# Patient Record
Sex: FEMALE | Race: WHITE | NOT HISPANIC OR LATINO | Employment: PART TIME | URBAN - METROPOLITAN AREA
[De-identification: names, ages, dates, MRNs, and addresses within clinical notes are randomized per-mention and may not be internally consistent; named-entity substitution may affect disease eponyms.]

---

## 2017-09-15 ENCOUNTER — TRANSCRIBE ORDERS (OUTPATIENT)
Dept: ADMINISTRATIVE | Facility: HOSPITAL | Age: 47
End: 2017-09-15

## 2017-09-15 DIAGNOSIS — Z12.31 VISIT FOR SCREENING MAMMOGRAM: Primary | ICD-10-CM

## 2017-10-12 ENCOUNTER — APPOINTMENT (OUTPATIENT)
Dept: MAMMOGRAPHY | Facility: HOSPITAL | Age: 47
End: 2017-10-12
Attending: OBSTETRICS & GYNECOLOGY

## 2017-11-01 ENCOUNTER — HOSPITAL ENCOUNTER (OUTPATIENT)
Dept: MAMMOGRAPHY | Facility: HOSPITAL | Age: 47
Discharge: HOME | End: 2017-11-01
Attending: OBSTETRICS & GYNECOLOGY | Admitting: OBSTETRICS & GYNECOLOGY

## 2017-11-01 DIAGNOSIS — Z12.31 VISIT FOR SCREENING MAMMOGRAM: ICD-10-CM

## 2017-11-01 PROCEDURE — G0202 SCR MAMMO BI INCL CAD: HCPCS

## 2017-11-01 PROCEDURE — 77063 BREAST TOMOSYNTHESIS BI: CPT

## 2017-11-02 PROCEDURE — 77067 SCR MAMMO BI INCL CAD: CPT | Performed by: RADIOLOGY

## 2017-11-02 PROCEDURE — 77063 BREAST TOMOSYNTHESIS BI: CPT | Performed by: RADIOLOGY

## 2018-09-06 ENCOUNTER — LAB (OUTPATIENT)
Dept: LAB | Facility: HOSPITAL | Age: 48
End: 2018-09-06

## 2018-09-06 ENCOUNTER — TRANSCRIBE ORDERS (OUTPATIENT)
Dept: LAB | Facility: HOSPITAL | Age: 48
End: 2018-09-06

## 2018-09-06 DIAGNOSIS — L03.90 CELLULITIS OF SKIN WITH LYMPHANGITIS: Primary | ICD-10-CM

## 2018-09-06 DIAGNOSIS — L03.90 CELLULITIS OF SKIN WITH LYMPHANGITIS: ICD-10-CM

## 2018-09-06 PROCEDURE — 87255 GENET VIRUS ISOLATE HSV: CPT

## 2018-09-06 PROCEDURE — 87254 VIRUS INOCULATION SHELL VIA: CPT

## 2018-09-13 LAB
HSV SPEC CULT: NORMAL
VZV SPEC QL CULT: NORMAL

## 2019-06-04 ENCOUNTER — OFFICE VISIT (OUTPATIENT)
Dept: INTERNAL MEDICINE | Facility: CLINIC | Age: 49
End: 2019-06-04

## 2019-06-04 VITALS
HEART RATE: 80 BPM | WEIGHT: 133 LBS | HEIGHT: 64 IN | SYSTOLIC BLOOD PRESSURE: 100 MMHG | BODY MASS INDEX: 22.71 KG/M2 | DIASTOLIC BLOOD PRESSURE: 58 MMHG

## 2019-06-04 DIAGNOSIS — R73.09 ABNORMAL GLUCOSE: ICD-10-CM

## 2019-06-04 DIAGNOSIS — R14.0 BLOATING: ICD-10-CM

## 2019-06-04 DIAGNOSIS — E03.9 HYPOTHYROIDISM, UNSPECIFIED TYPE: ICD-10-CM

## 2019-06-04 DIAGNOSIS — N92.6 IRREGULAR MENSES: ICD-10-CM

## 2019-06-04 DIAGNOSIS — L85.3 DRY SKIN: ICD-10-CM

## 2019-06-04 DIAGNOSIS — E78.2 MIXED HYPERLIPIDEMIA: ICD-10-CM

## 2019-06-04 DIAGNOSIS — R53.83 FATIGUE, UNSPECIFIED TYPE: Primary | ICD-10-CM

## 2019-06-04 PROCEDURE — 99204 OFFICE O/P NEW MOD 45 MIN: CPT | Performed by: INTERNAL MEDICINE

## 2019-06-04 RX ORDER — CHLORAL HYDRATE 500 MG
CAPSULE ORAL
COMMUNITY

## 2019-06-04 RX ORDER — CHOLECALCIFEROL (VITAMIN D3) 25 MCG
1 TABLET,CHEWABLE ORAL DAILY
COMMUNITY

## 2019-06-04 RX ORDER — MELATONIN
1000 DAILY
COMMUNITY

## 2019-06-04 RX ORDER — BIOTIN 10000 MCG
1 CAPSULE ORAL
COMMUNITY

## 2019-06-04 RX ORDER — CETIRIZINE HYDROCHLORIDE 10 MG/1
10 TABLET ORAL DAILY
COMMUNITY

## 2019-06-04 NOTE — PROGRESS NOTES
Patient Care Team:  Angelica Lynch PA-C as PCP - General (Internal Medicine)    Chief Complaint::   Chief Complaint   Patient presents with   • Establish Care        Subjective     HPI  New patient here to establish care. , two boys 20 and 16 yo.  Full time  at [employer], working summer at [employer].   PMH:Dr. Burnette-IBS with constipation.  Pt has bowel movements every day.  May use stool softeners occ, if needed.  Mother has diabetes.  A1C last checked 3/13/2019 5.7%  Has hx of depression, mostly following childbirth.  Mother and Father treatment for depression.  Gall bladder removed-2015  Has hx of high cholesterol.  Has hx of low thyroid.  Has had bloodwork with Dipesh Hanson in March.  Pilates 2-3 times a week.  Walks 1-2 a week.  Periods are sporadic.  Not on any birth control.  Constant dry skin, dry eyes. Wounds have delayed healing.    Has been tested for food allergies.    Pt had episode of shingles on face/scalp and had treatment with two antivirals.        PMH  The following portions of the patient's history were reviewed and updated as appropriate: allergies, current medications, past family history, past medical history, past social history, past surgical history and problem list.    Review of Systems:   Review of Systems   Constitutional: Positive for fatigue. Negative for activity change, appetite change, chills, diaphoresis, fever, unexpected weight gain and unexpected weight loss.   HENT: Negative for congestion, ear pain, hearing loss and sinus pressure.    Eyes: Positive for redness. Negative for blurred vision, double vision and visual disturbance.   Respiratory: Positive for cough and wheezing. Negative for chest tightness and shortness of breath.    Cardiovascular: Negative for chest pain, palpitations and leg swelling.   Gastrointestinal: Positive for abdominal distention and constipation. Negative for abdominal pain, blood in stool, GERD and indigestion.  "  Endocrine: Negative for cold intolerance and heat intolerance.   Genitourinary: Positive for menstrual problem. Negative for difficulty urinating, dysuria and hematuria.   Musculoskeletal: Positive for myalgias. Negative for arthralgias and joint swelling.   Skin: Negative for color change and skin lesions.   Allergic/Immunologic: Negative for environmental allergies and food allergies.   Neurological: Negative for dizziness, tremors, seizures, syncope, speech difficulty, weakness, headache, memory problem and confusion.   Hematological: Does not bruise/bleed easily.   Psychiatric/Behavioral: Negative for decreased concentration, sleep disturbance and depressed mood. The patient is not nervous/anxious.        Vital Signs  Vitals:    06/04/19 1143   BP: 100/58   BP Location: Left arm   Patient Position: Sitting   Cuff Size: Adult   Pulse: 80   Weight: 60.3 kg (133 lb)   Height: 162.6 cm (64\")       Labs  No visits with results within 3 Month(s) from this visit.   Latest known visit with results is:   Lab on 09/06/2018   Component Date Value Ref Range Status   • Viral Culture, Rapid, Varicella 09/06/2018 Comment   Final    Negative  No Varicella zoster virus detected by rapid viral culture.   • HSV Culture/Type 09/06/2018 Comment   Final    Negative  No Herpes simplex virus isolated.       Imaging  All imaging past 24 hours:   Imaging Results (last 24 hours)     ** No results found for the last 24 hours. **            Current Outpatient Medications:   •  Biotin 10 MG capsule, Take 1 tablet by mouth., Disp: , Rfl:   •  cetirizine (zyrTEC) 10 MG tablet, Take 10 mg by mouth Daily., Disp: , Rfl:   •  cholecalciferol (VITAMIN D3) 1000 units tablet, Take 1,000 Units by mouth Daily., Disp: , Rfl:   •  Cyanocobalamin (B-12) 1000 MCG capsule, Take 1 tablet by mouth Daily., Disp: , Rfl:   •  Digestive Enzymes (DIGESTIVE ENZYME PO), Take 1 capsule by mouth Daily., Disp: , Rfl:   •  Multiple Vitamins-Minerals (MULTIVITAL PO), " Take 1 capsule by mouth Daily., Disp: , Rfl:   •  Omega-3 Fatty Acids (FISH OIL) 1000 MG capsule capsule, Take  by mouth Daily With Breakfast., Disp: , Rfl:   •  Probiotic Product (PROBIOTIC-10) capsule, Take 1 capsule by mouth Daily., Disp: , Rfl:     Physical Exam:    Physical Exam   Constitutional: She is oriented to person, place, and time. She appears well-developed and well-nourished.   HENT:   Head: Normocephalic and atraumatic.   Right Ear: Hearing, tympanic membrane, external ear and ear canal normal.   Left Ear: Hearing, tympanic membrane, external ear and ear canal normal.   Nose: Nose normal.   Mouth/Throat: Uvula is midline, oropharynx is clear and moist and mucous membranes are normal.   Eyes: Conjunctivae, EOM and lids are normal. Pupils are equal, round, and reactive to light.   Neck: Normal range of motion and full passive range of motion without pain. Neck supple.   Cardiovascular: Normal rate, regular rhythm, normal heart sounds and intact distal pulses.   Pulmonary/Chest: Effort normal and breath sounds normal.   Abdominal: Soft. Normal appearance and bowel sounds are normal. She exhibits distension.   Musculoskeletal: Normal range of motion. She exhibits no tenderness.   Neurological: She is alert and oriented to person, place, and time. She has normal strength and normal reflexes.   Skin: Skin is warm, dry and intact.   Psychiatric: She has a normal mood and affect. Her speech is normal and behavior is normal. Judgment and thought content normal. Cognition and memory are normal.   Vitals reviewed.      Procedures        Assessment/Plan   Problem List Items Addressed This Visit     None      Visit Diagnoses     Fatigue, unspecified type    -  Primary    Relevant Medications    cholecalciferol (VITAMIN D3) 1000 units tablet    Omega-3 Fatty Acids (FISH OIL) 1000 MG capsule capsule    Cyanocobalamin (B-12) 1000 MCG capsule    Multiple Vitamins-Minerals (MULTIVITAL PO)    Other Relevant Orders     Vitamin B12    Vitamin D 25 Hydroxy    Nuclear Antigen Antibody, IFA    C-reactive Protein    Rheumatoid Factor    Follicle Stimulating Hormone    Progesterone    Hypothyroidism, unspecified type        Relevant Orders    CBC & Differential    Mixed hyperlipidemia        Relevant Orders    Lipid Panel    Bloating        Relevant Medications    Digestive Enzymes (DIGESTIVE ENZYME PO)    Probiotic Product (PROBIOTIC-10) capsule    Other Relevant Orders    Comprehensive Metabolic Panel    Ambulatory Referral to Allergy (Completed)    Dry skin        Relevant Medications    Biotin 10 MG capsule    Other Relevant Orders    TSH    Thyroid Peroxidase Antibody    Sjogren's Antibody, Anti-SS-A / -SS-B    Lupus Anticoagulant    Ambulatory Referral to Allergy (Completed)    Abnormal glucose        Relevant Orders    Hemoglobin A1c    C-Peptide    Irregular menses        Relevant Orders    CBC & Differential    TSH    Follicle Stimulating Hormone    Progesterone      Reviewed labs and records from Dipesh Hanson.  Will need records from     Return in about 6 weeks (around 7/16/2019) for Annual physical.    Plan of care reviewed with patient at the conclusion of today's visit. Education was provided regarding diagnosis, management, and any prescribed or recommended OTC medications.Patient verbalizes understanding of and agreement with management plan.     Angelica Lynch PA-C

## 2019-06-05 ENCOUNTER — LAB (OUTPATIENT)
Dept: LAB | Facility: HOSPITAL | Age: 49
End: 2019-06-05

## 2019-06-05 DIAGNOSIS — R53.83 FATIGUE, UNSPECIFIED TYPE: ICD-10-CM

## 2019-06-05 DIAGNOSIS — E03.9 HYPOTHYROIDISM, UNSPECIFIED TYPE: ICD-10-CM

## 2019-06-05 DIAGNOSIS — R73.09 ABNORMAL GLUCOSE: ICD-10-CM

## 2019-06-05 DIAGNOSIS — R14.0 BLOATING: ICD-10-CM

## 2019-06-05 DIAGNOSIS — L85.3 DRY SKIN: ICD-10-CM

## 2019-06-05 DIAGNOSIS — E78.2 MIXED HYPERLIPIDEMIA: ICD-10-CM

## 2019-06-05 DIAGNOSIS — N92.6 IRREGULAR MENSES: ICD-10-CM

## 2019-06-05 LAB
25(OH)D3 SERPL-MCNC: 102.5 NG/ML (ref 30–100)
ALBUMIN SERPL-MCNC: 4.3 G/DL (ref 3.5–5.2)
ALBUMIN/GLOB SERPL: 1.7 G/DL
ALP SERPL-CCNC: 30 U/L (ref 39–117)
ALT SERPL W P-5'-P-CCNC: 15 U/L (ref 1–33)
ANION GAP SERPL CALCULATED.3IONS-SCNC: 12.6 MMOL/L
AST SERPL-CCNC: 14 U/L (ref 1–32)
BASOPHILS # BLD AUTO: 0.04 10*3/MM3 (ref 0–0.2)
BASOPHILS NFR BLD AUTO: 0.7 % (ref 0–1.5)
BILIRUB SERPL-MCNC: 0.5 MG/DL (ref 0.2–1.2)
BUN BLD-MCNC: 11 MG/DL (ref 6–20)
BUN/CREAT SERPL: 16.2 (ref 7–25)
CALCIUM SPEC-SCNC: 9.3 MG/DL (ref 8.6–10.5)
CHLORIDE SERPL-SCNC: 100 MMOL/L (ref 98–107)
CHOLEST SERPL-MCNC: 194 MG/DL (ref 0–200)
CHROMATIN AB SERPL-ACNC: <10 IU/ML (ref 0–14)
CO2 SERPL-SCNC: 23.4 MMOL/L (ref 22–29)
CREAT BLD-MCNC: 0.68 MG/DL (ref 0.57–1)
CRP SERPL-MCNC: <0.03 MG/DL (ref 0–0.5)
DEPRECATED RDW RBC AUTO: 47 FL (ref 37–54)
EOSINOPHIL # BLD AUTO: 0.05 10*3/MM3 (ref 0–0.4)
EOSINOPHIL NFR BLD AUTO: 0.9 % (ref 0.3–6.2)
ERYTHROCYTE [DISTWIDTH] IN BLOOD BY AUTOMATED COUNT: 12.9 % (ref 12.3–15.4)
FSH SERPL-ACNC: 1.41 MIU/ML
GFR SERPL CREATININE-BSD FRML MDRD: 92 ML/MIN/1.73
GLOBULIN UR ELPH-MCNC: 2.6 GM/DL
GLUCOSE BLD-MCNC: 85 MG/DL (ref 65–99)
HBA1C MFR BLD: 5.66 % (ref 4.8–5.6)
HCT VFR BLD AUTO: 44.3 % (ref 34–46.6)
HDLC SERPL-MCNC: 75 MG/DL (ref 40–60)
HGB BLD-MCNC: 13.9 G/DL (ref 12–15.9)
IMM GRANULOCYTES # BLD AUTO: 0.01 10*3/MM3 (ref 0–0.05)
IMM GRANULOCYTES NFR BLD AUTO: 0.2 % (ref 0–0.5)
LDLC SERPL CALC-MCNC: 103 MG/DL (ref 0–100)
LDLC/HDLC SERPL: 1.37 {RATIO}
LYMPHOCYTES # BLD AUTO: 1.58 10*3/MM3 (ref 0.7–3.1)
LYMPHOCYTES NFR BLD AUTO: 28.1 % (ref 19.6–45.3)
MCH RBC QN AUTO: 31.2 PG (ref 26.6–33)
MCHC RBC AUTO-ENTMCNC: 31.4 G/DL (ref 31.5–35.7)
MCV RBC AUTO: 99.6 FL (ref 79–97)
MONOCYTES # BLD AUTO: 0.51 10*3/MM3 (ref 0.1–0.9)
MONOCYTES NFR BLD AUTO: 9.1 % (ref 5–12)
NEUTROPHILS # BLD AUTO: 3.44 10*3/MM3 (ref 1.7–7)
NEUTROPHILS NFR BLD AUTO: 61 % (ref 42.7–76)
NRBC BLD AUTO-RTO: 0 /100 WBC (ref 0–0.2)
PLATELET # BLD AUTO: 289 10*3/MM3 (ref 140–450)
PMV BLD AUTO: 11.5 FL (ref 6–12)
POTASSIUM BLD-SCNC: 4.2 MMOL/L (ref 3.5–5.2)
PROGEST SERPL-MCNC: 12.1 NG/ML
PROT SERPL-MCNC: 6.9 G/DL (ref 6–8.5)
RBC # BLD AUTO: 4.45 10*6/MM3 (ref 3.77–5.28)
SODIUM BLD-SCNC: 136 MMOL/L (ref 136–145)
TRIGL SERPL-MCNC: 82 MG/DL (ref 0–150)
TSH SERPL DL<=0.05 MIU/L-ACNC: 2.88 MIU/ML (ref 0.27–4.2)
VIT B12 BLD-MCNC: 730 PG/ML (ref 211–946)
VLDLC SERPL-MCNC: 16.4 MG/DL (ref 5–40)
WBC NRBC COR # BLD: 5.63 10*3/MM3 (ref 3.4–10.8)

## 2019-06-05 PROCEDURE — 84443 ASSAY THYROID STIM HORMONE: CPT

## 2019-06-05 PROCEDURE — 83036 HEMOGLOBIN GLYCOSYLATED A1C: CPT

## 2019-06-05 PROCEDURE — 86431 RHEUMATOID FACTOR QUANT: CPT

## 2019-06-05 PROCEDURE — 86140 C-REACTIVE PROTEIN: CPT

## 2019-06-05 PROCEDURE — 82607 VITAMIN B-12: CPT

## 2019-06-05 PROCEDURE — 85670 THROMBIN TIME PLASMA: CPT

## 2019-06-05 PROCEDURE — 80061 LIPID PANEL: CPT

## 2019-06-05 PROCEDURE — 80053 COMPREHEN METABOLIC PANEL: CPT

## 2019-06-05 PROCEDURE — 85613 RUSSELL VIPER VENOM DILUTED: CPT

## 2019-06-05 PROCEDURE — 85732 THROMBOPLASTIN TIME PARTIAL: CPT

## 2019-06-05 PROCEDURE — 36415 COLL VENOUS BLD VENIPUNCTURE: CPT

## 2019-06-05 PROCEDURE — 86376 MICROSOMAL ANTIBODY EACH: CPT

## 2019-06-05 PROCEDURE — 82306 VITAMIN D 25 HYDROXY: CPT

## 2019-06-05 PROCEDURE — 84681 ASSAY OF C-PEPTIDE: CPT

## 2019-06-05 PROCEDURE — 86038 ANTINUCLEAR ANTIBODIES: CPT

## 2019-06-05 PROCEDURE — 86235 NUCLEAR ANTIGEN ANTIBODY: CPT

## 2019-06-05 PROCEDURE — 84144 ASSAY OF PROGESTERONE: CPT

## 2019-06-05 PROCEDURE — 85705 THROMBOPLASTIN INHIBITION: CPT

## 2019-06-05 PROCEDURE — 85025 COMPLETE CBC W/AUTO DIFF WBC: CPT

## 2019-06-05 PROCEDURE — 83001 ASSAY OF GONADOTROPIN (FSH): CPT

## 2019-06-06 LAB
ANA SER QL IA: NEGATIVE
C PEPTIDE SERPL-MCNC: 1.4 NG/ML (ref 1.1–4.4)
ENA SS-A AB SER-ACNC: <0.2 AI (ref 0–0.9)
ENA SS-B AB SER-ACNC: <0.2 AI (ref 0–0.9)
THYROPEROXIDASE AB SERPL-ACNC: 12 IU/ML (ref 0–34)

## 2019-06-07 LAB
LA NT DPL PPP: 32.6 SEC (ref 0–55)
LA NT DPL/LA NT HPL PPP-RTO: 1.03 RATIO (ref 0–1.4)
LA NT PLATELET PPP: 30 SEC (ref 0–51.9)
LUPUS ANTICOAGULANT REFLEX: NORMAL
SCREEN DRVVT: 27.8 SEC (ref 0–47)
THROMBIN TIME: 17.9 SEC (ref 0–23)

## 2019-10-10 ENCOUNTER — TELEPHONE (OUTPATIENT)
Dept: INTERNAL MEDICINE | Facility: CLINIC | Age: 49
End: 2019-10-10

## 2019-10-10 NOTE — TELEPHONE ENCOUNTER
"Patient was needing results from her allergist appt, claims that doctor didn't really do a full food allergy panel. That doctor said \"I can't really help you take some allergy pills\"   "

## 2019-10-17 NOTE — TELEPHONE ENCOUNTER
The referral you made 06/2019 was to Dr. Hernandez. There is one ov note scanned under media but that is from June.

## 2019-12-17 ENCOUNTER — TELEPHONE (OUTPATIENT)
Dept: INTERNAL MEDICINE | Facility: CLINIC | Age: 49
End: 2019-12-17

## 2019-12-17 DIAGNOSIS — J06.9 ACUTE URI: Primary | ICD-10-CM

## 2019-12-17 RX ORDER — AZITHROMYCIN 250 MG/1
TABLET, FILM COATED ORAL
Qty: 6 TABLET | Refills: 0 | Status: SHIPPED | OUTPATIENT
Start: 2019-12-17 | End: 2020-03-04

## 2019-12-17 NOTE — TELEPHONE ENCOUNTER
Patient called and stated she went to the [facility] over the weekend. They gave her antibiotics and her symptoms are still there and causing head congestion. Patient would like a refill of antibiotics. Please advise     Pt call back  [phone]

## 2019-12-17 NOTE — TELEPHONE ENCOUNTER
Called patient she has productive green cough at times. She has green nasal congestion. She was give a medrol dose pack and is on day 3. She is taking mucinex. At [facility] clinic flu was negative. She says the diagnosed her with a URI. Denies body aches and chills

## 2020-03-04 ENCOUNTER — OFFICE VISIT (OUTPATIENT)
Dept: ORTHOPEDIC SURGERY | Facility: CLINIC | Age: 50
End: 2020-03-04

## 2020-03-04 VITALS — HEART RATE: 93 BPM | WEIGHT: 129.8 LBS | BODY MASS INDEX: 22.16 KG/M2 | HEIGHT: 64 IN | OXYGEN SATURATION: 99 %

## 2020-03-04 DIAGNOSIS — M72.2 PLANTAR FASCIITIS OF LEFT FOOT: ICD-10-CM

## 2020-03-04 DIAGNOSIS — M25.80 SESAMOIDITIS: ICD-10-CM

## 2020-03-04 DIAGNOSIS — M79.672 LEFT FOOT PAIN: Primary | ICD-10-CM

## 2020-03-04 PROCEDURE — 99203 OFFICE O/P NEW LOW 30 MIN: CPT | Performed by: PHYSICIAN ASSISTANT

## 2020-03-04 NOTE — PROGRESS NOTES
[facility] [place] [facility] Note    Subjective     Patient: Ivette Warren  : 1970    Primary Care Provider: Angelica Lynch PA-C    Requesting Provider: As above    Pain of the Left Foot      History    Chief Complaint: Left foot pain    History of Present Illness: This is a very pleasant 49-year-old female presenting today to discuss her longstanding left forefoot pain as well as left heel pain.  She reports back in 1991 she had surgery at the first metatarsal head which she was told they removed a bursa.  Her symptoms prior to that were pain with walking and weightbearing at the first metatarsal head.  She was a dancer at the time.  She is continued to have intermittent pain under the first metatarsal head for many years.  Rates to be 7/10 and sharp she has had different types of orthotics which helps some.  She has tried to offload as well.  She remembers at some point being told that she was refracturing her sesamoids.  She make sure she wears good supportive shoes most of the time.  She avoids any heels because this worsens the pain.  She is also having heel pain that she rates to be 10/10.  Pain is worse first thing in the morning and getting up from a seated position.  She has had no treatment for this problem.  She is here for further evaluation and treatment recommendations.    Current Outpatient Medications on File Prior to Visit   Medication Sig Dispense Refill   • Biotin 10 MG capsule Take 1 tablet by mouth.     • cetirizine (zyrTEC) 10 MG tablet Take 10 mg by mouth Daily.     • cholecalciferol (VITAMIN D3) 1000 units tablet Take 1,000 Units by mouth Daily.     • Cyanocobalamin (B-12) 1000 MCG capsule Take 1 tablet by mouth Daily.     • Digestive Enzymes (DIGESTIVE ENZYME PO) Take 1 capsule by mouth Daily.     • Multiple Vitamins-Minerals (MULTIVITAL PO) Take 1 capsule by mouth Daily.     • Omega-3 Fatty Acids (FISH OIL) 1000 MG capsule capsule Take  by mouth Daily  With Breakfast.     • Probiotic Product (PROBIOTIC-10) capsule Take 1 capsule by mouth Daily.       No current facility-administered medications on file prior to visit.       Allergies   Allergen Reactions   • Codeine Itching and GI Intolerance      History reviewed. No pertinent past medical history.  Past Surgical History:   Procedure Laterality Date   • CHOLECYSTECTOMY  2015     Family History   Problem Relation Age of Onset   • Arthritis Mother    • Diabetes Mother    • Hyperlipidemia Mother    • Arthritis Father    • Heart disease Father    • Hypertension Father    • BRCA 1/2 Neg Hx    • Breast cancer Neg Hx    • Colon cancer Neg Hx    • Endometrial cancer Neg Hx    • Ovarian cancer Neg Hx       Social History     Socioeconomic History   • Marital status:      Spouse name: Not on file   • Number of children: Not on file   • Years of education: Not on file   • Highest education level: Not on file   Tobacco Use   • Smoking status: Never Smoker   • Smokeless tobacco: Never Used   Substance and Sexual Activity   • Alcohol use: Yes     Frequency: 4 or more times a week   • Drug use: No   • Sexual activity: Defer        Review of Systems   Constitutional: Negative.         Night sweats   HENT: Positive for congestion and mouth sores.    Eyes: Positive for itching.   Respiratory: Positive for cough.    Cardiovascular: Positive for palpitations.   Gastrointestinal: Positive for abdominal distention and constipation.   Endocrine: Negative.    Genitourinary: Negative.    Musculoskeletal: Positive for arthralgias.   Skin: Negative.    Allergic/Immunologic: Positive for food allergies.   Neurological: Negative.    Hematological: Negative.    Psychiatric/Behavioral: Positive for decreased concentration.       The following portions of the patient's history were reviewed and updated as appropriate: allergies, current medications, past family history, past medical history, past social history, past surgical history and  "problem list.      Objective      Physical Exam  Pulse 93   Ht 162.6 cm (64.02\")   Wt 58.9 kg (129 lb 12.8 oz)   SpO2 99%   BMI 22.27 kg/m²     Body mass index is 22.27 kg/m².    GENERAL: Body habitus: normal weight for height    Lower extremity edema: Left: none; Right: none    Varicose veins:  Left: none; Right: none    Gait: antalgic     Mental Status:  awake and alert; oriented to person, place, and time    Voice:  clear  SKIN:  Normal    Hair Growth:  Right:normal; Left:  normal  HEENT: Head: Normocephalic, atraumatic,  without obvious abnormality.  eye: normal external eye, no icterus   PULM:  Repiratory effort normal    Ortho Exam  V:  Dorsalis Pedis:  Right: 2+; Left:2+    Posterior Tibial: Right:2+; Left:2+    Capillary Refill:  Brisk  MSK:      Tibia:  Right:  non tender; Left:  non tender      Ankle:  Right: non tender; Left:  non tender      Foot:  Right:  non tender, ROM  normal and motor function  normal; Left:  tender under the 1st MT head with more tenderness over the tibial sesamoid.  Exquisitely tender over the origin of the plantar fascia., ROM  normal and motor function  normal      NEURO: Heel Walking:  Right:  normal; Left:  painful    Toe Walking:  Right:  normal; Left:  limited ability, painful     [place]-Anthony 5.07 monofilament test: not evaluated    Lower extremity sensation: intact     Calf Atrophy:none    Motor Function: all 5/5          Medical Decision Making    Data Review:   ordered and reviewed x-rays today    Assessment:  1. Left foot pain    2. Sesamoiditis    3. Plantar fasciitis of left foot        Plan:  1.  Left sesamoiditis.  I reviewed his x-rays and clinical findings with the patient.  On exam, she is tender under the first metatarsal head with more tenderness over the tibial sesamoid.  X-rays today show  no acute bony abnormalities, but the tibial sesamoid under the first metatarsal head does look as if it had fractured previously, but I did not appreciate any acute " "findings.  No other unusual bony features.  I question whether this is actually a bipartite sesamoid.  In any case, we discussed further treatment including offloading with a dancer's pad.  It may be flared more than usual secondary to her plantar fasciitis and avoiding weightbearing on her heel.  I have given her some adhesive dancer's pad for her shoes as well as given her prescription for custom orthotics with a dancer's pad.  She will return to see us in 2 to 3 months or sooner if needed.    2.  Left plantar fasciitis:  I explained plantar fasciitis in detail to the patient.  I explained to the bow-string mechanism of the plantar fascia.  I went over the current research of the [employer] with them.  I explained the treatments that were not statistically significant in improving the problem including: injection of steroids, special orthotics, special shoes, surgery, medication, ice, not working, etc.    I explained the treatments that are statistically significant at improving the problem.  These include stretching/night splinting, casting, PRP.    I explained the most important treatment: Stretching and night splinting.  I went over the patient information sheet with them, I showed them the stretches and they were able to reproduce them.  I emphasized the \"toe pull\" as the most important stretch.  They need to do the stretches 5 repetitions each, 6-8 times per day.  I explained how to do them at work, at home, before getting out of bed, before getting out of the car, and before getting up from a chair.    We provided them with a night splint.    If they do not improve after 4 months of aggressive stretching and night splinting, the next step will be a short leg fiberglass walking cast worn for 6 weeks.    · Preprinted education was provided to the patient.      Patient will begin aggressive stretching and night splinting return for casting if needed.    Patient history, " diagnosis and treatment plan discussed with Dr. Fernandez.        .        Mitali Bolden PA-C  03/05/20  10:38 AM

## 2020-05-06 ENCOUNTER — OFFICE VISIT (OUTPATIENT)
Dept: ORTHOPEDIC SURGERY | Facility: CLINIC | Age: 50
End: 2020-05-06

## 2020-05-06 VITALS — HEIGHT: 64 IN | BODY MASS INDEX: 22.88 KG/M2 | OXYGEN SATURATION: 99 % | WEIGHT: 134 LBS | HEART RATE: 105 BPM

## 2020-05-06 DIAGNOSIS — M25.80 SESAMOIDITIS: Primary | ICD-10-CM

## 2020-05-06 PROCEDURE — 99212 OFFICE O/P EST SF 10 MIN: CPT | Performed by: PHYSICIAN ASSISTANT

## 2020-05-06 NOTE — PROGRESS NOTES
[facility] [place] [facility] Note    Subjective     Patient: Ivette Warren  : 1970    Primary Care Provider: Angelica Lynch PA-C    Requesting Provider: As above    Follow-up (9 week f/u; Plantar fasciitis of left foot )      History    Chief Complaint: Follow-up left foot    History of Present Illness: Patient returns today for follow-up of her left foot sesamoiditis and plantar fasciitis.  She is gotten custom orthotics from the [facility] but reports they are painful feeling that the dancer's pad is too distal.  She does report some improvement of the pain with a good shoe wear.  She has no new symptoms.  The plantar fasciitis persists and she continues to stretch.    Current Outpatient Medications on File Prior to Visit   Medication Sig Dispense Refill   • Biotin 10 MG capsule Take 1 tablet by mouth.     • cetirizine (zyrTEC) 10 MG tablet Take 10 mg by mouth Daily.     • cholecalciferol (VITAMIN D3) 1000 units tablet Take 1,000 Units by mouth Daily.     • Cyanocobalamin (B-12) 1000 MCG capsule Take 1 tablet by mouth Daily.     • Digestive Enzymes (DIGESTIVE ENZYME PO) Take 1 capsule by mouth Daily.     • Multiple Vitamins-Minerals (MULTIVITAL PO) Take 1 capsule by mouth Daily.     • Omega-3 Fatty Acids (FISH OIL) 1000 MG capsule capsule Take  by mouth Daily With Breakfast.     • Probiotic Product (PROBIOTIC-10) capsule Take 1 capsule by mouth Daily.       No current facility-administered medications on file prior to visit.       Allergies   Allergen Reactions   • Codeine Itching and GI Intolerance      History reviewed. No pertinent past medical history.  Past Surgical History:   Procedure Laterality Date   • CHOLECYSTECTOMY       Family History   Problem Relation Age of Onset   • Arthritis Mother    • Diabetes Mother    • Hyperlipidemia Mother    • Arthritis Father    • Heart disease Father    • Hypertension Father    • BRCA 1/2 Neg Hx    • Breast cancer Neg Hx    •  "Colon cancer Neg Hx    • Endometrial cancer Neg Hx    • Ovarian cancer Neg Hx       Social History     Socioeconomic History   • Marital status:      Spouse name: Not on file   • Number of children: Not on file   • Years of education: Not on file   • Highest education level: Not on file   Tobacco Use   • Smoking status: Never Smoker   • Smokeless tobacco: Never Used   Substance and Sexual Activity   • Alcohol use: Yes     Frequency: 4 or more times a week   • Drug use: No   • Sexual activity: Defer        Review of Systems   Constitutional: Negative.    HENT: Negative.    Eyes: Negative.    Respiratory: Negative.    Cardiovascular: Negative.    Gastrointestinal: Negative.    Endocrine: Negative.    Genitourinary: Negative.    Musculoskeletal: Positive for arthralgias.   Skin: Negative.    Allergic/Immunologic: Negative.    Neurological: Negative.    Hematological: Negative.    Psychiatric/Behavioral: Negative.        The following portions of the patient's history were reviewed and updated as appropriate: allergies, current medications, past family history, past medical history, past social history, past surgical history and problem list.      Objective      Physical Exam  Pulse 105   Ht 162.6 cm (64.02\")   Wt 60.8 kg (134 lb)   SpO2 99%   BMI 22.99 kg/m²     Body mass index is 22.99 kg/m².    Patient is well developed, well nourished and in no acute distress.  Alert and oriented x 3.    Ortho Exam  V:  Dorsalis Pedis:   Left:2+    Posterior Tibial:Left:2+    Capillary Refill:  Brisk  MSK:  Tibia:   Left:  non tender      Ankle:   Left:  non tender      Foot:   Left:  tender over the tibial sesamoid.  Mild tenderness at the origin of the plantar fascia.      NEURO: [place]-Anthony 5.07 monofilament test: not evaluated    Lower extremity sensation: intact     Calf Atrophy:none    Motor Function: all 5/5            Medical Decision Making    Data Review:   none    Assessment:  1. Sesamoiditis  "       Plan:  Left sesamoiditis.  I reviewed past and current treatment and clinical findings with the patient.  Dr. Flynn is also seen the patient.  Her dancer's pad in her custom orthotics is too distal.  We have marked on the orthotic with a pen where it should be placed.  [facility].  There are 2 other things that we may consider adding to her treatment 1, cortisone injection to help with inflammation.  2, physical therapy.  Patient would like to try therapy before considering injection.  Plan today is that she do physical therapy at [facility] with Nickolas Abdul as he knows sesamoid-itis physical therapy.  She will return in 2 months to see how she is progressed or sooner if needed.    Patient history, diagnosis and treatment plan discussed with Dr. Flynn.        Mitali Bolden PA-C  05/06/20  13:45

## 2020-05-22 ENCOUNTER — TELEMEDICINE (OUTPATIENT)
Dept: INTERNAL MEDICINE | Facility: CLINIC | Age: 50
End: 2020-05-22

## 2020-05-22 DIAGNOSIS — R05.9 COUGH: Primary | ICD-10-CM

## 2020-05-22 PROCEDURE — U0003 INFECTIOUS AGENT DETECTION BY NUCLEIC ACID (DNA OR RNA); SEVERE ACUTE RESPIRATORY SYNDROME CORONAVIRUS 2 (SARS-COV-2) (CORONAVIRUS DISEASE [COVID-19]), AMPLIFIED PROBE TECHNIQUE, MAKING USE OF HIGH THROUGHPUT TECHNOLOGIES AS DESCRIBED BY CMS-2020-01-R: HCPCS | Performed by: NURSE PRACTITIONER

## 2020-05-22 PROCEDURE — 99213 OFFICE O/P EST LOW 20 MIN: CPT | Performed by: NURSE PRACTITIONER

## 2020-05-22 NOTE — PROGRESS NOTES
Ivette Warren  1970  [id number]  Patient Care Team:  Angelica Lynch PA-C as PCP - General (Internal Medicine)    Ivette Warren is a pleasant 49 y.o. female who presents for evaluation of Fever    Chief Complaint   Patient presents with   • Fever     This video visit occurred during the Coronavirus (Covid-19) public health emergency.  Time spent was approximately minutes.  Patient identity has been confirmed using name and date of birth.  I discussed with the patient the nature of our telemedicine visits that:  --I would evaluate the patient and recommend diagnostics and treatment based on my assessment.  --Our sessions are not being recorded in the personal health information is protected.  --Our team would provide follow-up care in person if/when needed.    HPI:   Did not feel well starting Mon night.  HX allergies.  Taking zyrtec and prn sudafed.  Reports mild symptoms including low grade fever, cough and nasal congestion that has been gradually worse this week.  Some dyspnea with exertion but this is not uncommon for her.  She denies serious symptoms of shortness of breath or serious illness.  She has been to grocery and [employer] last weekend, medical appointment Monday.  Has been following CDC guidelines when out to the grocery with masking and social distancing.  No past medical history on file.  Past Surgical History:   Procedure Laterality Date   • CHOLECYSTECTOMY  2015     Family History   Problem Relation Age of Onset   • Arthritis Mother    • Diabetes Mother    • Hyperlipidemia Mother    • Arthritis Father    • Heart disease Father    • Hypertension Father    • BRCA 1/2 Neg Hx    • Breast cancer Neg Hx    • Colon cancer Neg Hx    • Endometrial cancer Neg Hx    • Ovarian cancer Neg Hx      Social History     Tobacco Use   Smoking Status Never Smoker   Smokeless Tobacco Never Used     Allergies   Allergen Reactions   • Codeine Itching and GI Intolerance       Current Outpatient Medications:   •   Biotin 10 MG capsule, Take 1 tablet by mouth., Disp: , Rfl:   •  cetirizine (zyrTEC) 10 MG tablet, Take 10 mg by mouth Daily., Disp: , Rfl:   •  cholecalciferol (VITAMIN D3) 1000 units tablet, Take 1,000 Units by mouth Daily., Disp: , Rfl:   •  Cyanocobalamin (B-12) 1000 MCG capsule, Take 1 tablet by mouth Daily., Disp: , Rfl:   •  Digestive Enzymes (DIGESTIVE ENZYME PO), Take 1 capsule by mouth Daily., Disp: , Rfl:   •  Multiple Vitamins-Minerals (MULTIVITAL PO), Take 1 capsule by mouth Daily., Disp: , Rfl:   •  Omega-3 Fatty Acids (FISH OIL) 1000 MG capsule capsule, Take  by mouth Daily With Breakfast., Disp: , Rfl:   •  Probiotic Product (PROBIOTIC-10) capsule, Take 1 capsule by mouth Daily., Disp: , Rfl:     Review of Systems  There were no vitals taken for this visit.    Physical Exam   Constitutional: She appears well-developed and well-nourished.   Pulmonary/Chest: No respiratory distress.   Skin: Skin is warm and dry.   Psychiatric: She has a normal mood and affect. Her speech is normal and behavior is normal. Judgment and thought content normal. Cognition and memory are normal.   Vitals reviewed.      Results Review:  None    Assessment/Plan:  Ivette was seen today for fever.    Diagnoses and all orders for this visit:    Cough  -     SARS-CoV-2, CASH ([facility]) - Swab, Oropharynx; Future       There are no Patient Instructions on file for this visit.  Plan of care reviewed with patient at the conclusion of today's visit. Education was provided regarding diagnosis, management and any prescribed or recommended OTC medications.  Patient verbalizes understanding of and agreement with management plan.    No follow-ups on file.    *Note that portions of this note were completed with a voice recognition program.  Efforts were made to edit the dictation but occasionally words are transcribed.    HERMANN Osman

## 2020-07-28 ENCOUNTER — TELEMEDICINE (OUTPATIENT)
Dept: INTERNAL MEDICINE | Facility: CLINIC | Age: 50
End: 2020-07-28

## 2020-07-28 VITALS — WEIGHT: 130 LBS | BODY MASS INDEX: 22.2 KG/M2 | HEIGHT: 64 IN

## 2020-07-28 DIAGNOSIS — R53.83 FATIGUE, UNSPECIFIED TYPE: Primary | ICD-10-CM

## 2020-07-28 DIAGNOSIS — E34.9 HORMONE IMBALANCE: ICD-10-CM

## 2020-07-28 PROCEDURE — 99214 OFFICE O/P EST MOD 30 MIN: CPT | Performed by: PHYSICIAN ASSISTANT

## 2020-07-28 NOTE — PROGRESS NOTES
Patient Care Team:  Angelica Lynch PA-C as PCP - General (Internal Medicine)    Chief Complaint::   Chief Complaint   Patient presents with   • Fatigue    You have chosen to receive care through a video visit. Do you consent to use a video visit for your medical care today? YES    Subjective     HPI  49 year old female presents for followup on fatigue, hormone imbalance, and weight. She [place] and had hormone workup.  Lab [facility] results show low testosterone.  She reports decrease in libido and energy, heavy & irregular menses, and difficulty losing weight.  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  She has also recently moved.        The following portions of the patient's history were reviewed and updated as appropriate: active problem list, medication list, allergies, family history, social history    Review of Systems:   Review of Systems   Constitutional: Positive for fatigue. Negative for activity change, appetite change, diaphoresis, unexpected weight gain and unexpected weight loss.   HENT: Negative for hearing loss.    Eyes: Negative for visual disturbance.   Respiratory: Negative for chest tightness and shortness of breath.    Cardiovascular: Negative for chest pain, palpitations and leg swelling.   Gastrointestinal: Negative for abdominal pain, blood in stool, GERD and indigestion.   Endocrine: Negative for cold intolerance and heat intolerance.   Genitourinary: Positive for menstrual problem. Negative for dysuria and hematuria.   Musculoskeletal: Negative for arthralgias and myalgias.   Skin: Negative for skin lesions.   Allergic/Immunologic: Negative for environmental allergies and food allergies.   Neurological: Negative for tremors, seizures, syncope, speech difficulty, weakness, headache, memory problem and confusion.   Hematological: Does not bruise/bleed easily.  "  Psychiatric/Behavioral: Positive for decreased concentration. Negative for sleep disturbance and depressed mood. The patient is not nervous/anxious.        Vital Signs  Vitals:    07/28/20 1303   Weight: 59 kg (130 lb)   Height: 162.6 cm (64\")     Body mass index is 22.31 kg/m².    Labs  Admission on 05/22/2020, Discharged on 05/22/2020   Component Date Value Ref Range Status   • SARS-CoV-2, CASH 05/22/2020 Not Detected  Not Detected Final    This test was developed and its performance characteristics determined  by [facility]. This test has not been FDA cleared or  approved. This test has been authorized by FDA under an Emergency Use  Authorization (EUA). This test is only authorized for the duration of  time the declaration that circumstances exist justifying the  authorization of the emergency use of in vitro diagnostic tests for  detection of SARS-CoV-2 virus and/or diagnosis of COVID-19 infection  under section 564(b)(1) of the Act, 21 U.S.C. 360bbb-3(b)(1), unless  the authorization is terminated or revoked sooner.  When diagnostic testing is negative, the possibility of a false  negative result should be considered in the context of a patient's  recent exposures and the presence of clinical signs and symptoms  consistent with COVID-19. An individual without symptoms of COVID-19  and who is not shedding SARS-CoV-2 virus would expect to have a  negative (not detected) result in this assay.       Imaging  No radiology results for the last 30 days.      Current Outpatient Medications:   •  Biotin 10 MG capsule, Take 1 tablet by mouth., Disp: , Rfl:   •  cetirizine (zyrTEC) 10 MG tablet, Take 10 mg by mouth Daily., Disp: , Rfl:   •  cholecalciferol (VITAMIN D3) 1000 units tablet, Take 1,000 Units by mouth Daily., Disp: , Rfl:   •  Cyanocobalamin (B-12) 1000 MCG capsule, Take 1 tablet by mouth Daily., Disp: , Rfl:   •  Digestive Enzymes (DIGESTIVE ENZYME PO), Take 1 capsule by mouth Daily., Disp: , Rfl: "   •  Multiple Vitamins-Minerals (MULTIVITAL PO), Take 1 capsule by mouth Daily., Disp: , Rfl:   •  Omega-3 Fatty Acids (FISH OIL) 1000 MG capsule capsule, Take  by mouth Daily With Breakfast., Disp: , Rfl:   •  Probiotic Product (PROBIOTIC-10) capsule, Take 1 capsule by mouth Daily., Disp: , Rfl:     Physical Exam:    Physical Exam   Constitutional: She is oriented to person, place, and time. She appears well-developed and well-nourished.   HENT:   Head: Normocephalic and atraumatic.   Eyes: Pupils are equal, round, and reactive to light. Conjunctivae and EOM are normal.   Neck: Normal range of motion. Neck supple.   Neurological: She is alert and oriented to person, place, and time.   Skin: No erythema. No pallor.   Psychiatric: She has a normal mood and affect. Her behavior is normal. Judgment and thought content normal.       Procedures        Assessment/Plan   Problem List Items Addressed This Visit        Endocrine    Hormone imbalance    Current Assessment & Plan     Overall, her hormones appear to be in appropriate range for a perimenopausal woman.  Her testosterone is low, but that is expected.  Discussed the possibility of compounding hormone replacement therapy to help with libido, fatigue, and irregular menstrual cycle.  We will call [facility] to see if this is an option.            Other    Fatigue - Primary    Current Assessment & Plan     Discussed vitamin B B6 and B12 supplementation.  Continue daily cardiovascular exercise.  Continue healthy diet.         Relevant Medications    cholecalciferol (VITAMIN D3) 1000 units tablet    Omega-3 Fatty Acids (FISH OIL) 1000 MG capsule capsule    Cyanocobalamin (B-12) 1000 MCG capsule    Multiple Vitamins-Minerals (MULTIVITAL PO)      This visit has been rescheduled as a video visit to comply with patient safety concerns in accordance with CDC recommendations. Total time of discussion was 25 minutes.    Return in about 3 months (around 10/28/2020) for  Recheck.    Plan of care reviewed with patient at the conclusion of today's visit. Education was provided regarding diagnosis, management, and any prescribed or recommended OTC medications.Patient verbalizes understanding of and agreement with management plan.       Angelica Lynch PA-C    Please note that portions of this note were completed with a voice recognition program. Efforts were made to edit the dictations, but occasionally words are mistranscribed.

## 2020-07-29 PROBLEM — R53.83 FATIGUE: Status: ACTIVE | Noted: 2020-07-29

## 2020-07-29 PROBLEM — E34.9 HORMONE IMBALANCE: Status: ACTIVE | Noted: 2020-07-29

## 2020-07-30 NOTE — PATIENT INSTRUCTIONS

## 2020-07-30 NOTE — ASSESSMENT & PLAN NOTE
Discussed vitamin B B6 and B12 supplementation.  Continue daily cardiovascular exercise.  Continue healthy diet.

## 2020-07-30 NOTE — ASSESSMENT & PLAN NOTE
Overall, her hormones appear to be in appropriate range for a perimenopausal woman.  Her testosterone is low, but that is expected.  Discussed the possibility of compounding hormone replacement therapy to help with libido, fatigue, and irregular menstrual cycle.  We will call [facility] to see if this is an option.

## 2020-08-06 ENCOUNTER — TELEPHONE (OUTPATIENT)
Dept: INTERNAL MEDICINE | Facility: CLINIC | Age: 50
End: 2020-08-06

## 2020-08-06 NOTE — TELEPHONE ENCOUNTER
Please fax most recent lab results from [facility] (printed)     with DX of:  Decreased energy  Decreased libido    To [facility].

## 2020-08-28 ENCOUNTER — TELEPHONE (OUTPATIENT)
Dept: INTERNAL MEDICINE | Facility: CLINIC | Age: 50
End: 2020-08-28

## 2020-08-28 NOTE — TELEPHONE ENCOUNTER
Discussed with patient.  She will test if symptoms develop.  She is in mandatory quarantine for now.  
PATIENT CALLED AND STATED THAT SHE HAS BEEN EXPOSED TO STUDENT THAT IS COVID POSITIVE.  SHE SAID OTHER THAN HER USUAL ALLERGY SYMPTOMS LIKE SCRATCHY THROAT.  THE ONLY THE SYMPTOM THAT IS CONCERNING IS THE UPSET STOMACH WITH SOME MILD DIARRHEA.    SHE WAS COVID NEGATIVE 2 WEEKS AGO, BUT WANTS TO KNOW IF SHE SHOULD BE TESTED AGAIN.      PLEASE CONTACT PATIENT TO ADVISE.    CALLBACK:  [phone]  
phone

## 2020-09-04 RX ORDER — PROGESTERONE 100 %
POWDER (GRAM) MISCELLANEOUS
Qty: 1 BOTTLE | Refills: 5 | Status: SHIPPED | OUTPATIENT
Start: 2020-09-04 | End: 2020-12-17

## 2020-09-04 RX ORDER — EMOLLIENT BASE
CREAM (GRAM) TOPICAL
Qty: 30 G | Refills: 5 | Status: SHIPPED | OUTPATIENT
Start: 2020-09-04 | End: 2020-09-30 | Stop reason: SDUPTHER

## 2020-09-30 RX ORDER — EMOLLIENT BASE
CREAM (GRAM) TOPICAL
Qty: 30 G | Refills: 5 | Status: SHIPPED | OUTPATIENT
Start: 2020-09-30 | End: 2021-06-10 | Stop reason: SDUPTHER

## 2020-09-30 NOTE — TELEPHONE ENCOUNTER
Pharmacy Name: [facility] - [place], [place] - [facility] [address] - [phone]  - [phone]      Pharmacy representative name: GISSELLE    Pharmacy representative phone number: [phone]    What medication are you calling in regards to: Cream Base cream TESTOSTERONE     What question does the pharmacy have: NEEDS REFILL, THIRD REQUEST     Who is the provider that prescribed the medication: TONEY CONNELL    Additional notes: PER PHARMACIST, TONEY CONNELL IS PA SHE NEEDS TO SEE  IF PHYSICIAN WILL ORDER REFILLS THEY WON'T HAVE TO CALL EACH MONTH.

## 2020-12-17 ENCOUNTER — TELEPHONE (OUTPATIENT)
Dept: INTERNAL MEDICINE | Facility: CLINIC | Age: 50
End: 2020-12-17

## 2020-12-17 DIAGNOSIS — E34.9 HORMONE IMBALANCE: Primary | ICD-10-CM

## 2020-12-17 RX ORDER — PROGESTERONE 100 %
POWDER (GRAM) MISCELLANEOUS
Qty: 1 BOTTLE | Refills: 5 | Status: SHIPPED | OUTPATIENT
Start: 2020-12-17 | End: 2020-12-18

## 2020-12-17 NOTE — TELEPHONE ENCOUNTER
[facility] requested refill on Progesterone  Slow release 50 mg capsule  Directions- take 1 capsule every evening at bedtime on days 1 through 11 of cycle

## 2020-12-17 NOTE — TELEPHONE ENCOUNTER
Brandy jose Donnellson called stating the patient wants to take 50mg of progesterone days 1 through 25 of cycle instead of the 1-11.

## 2020-12-17 NOTE — TELEPHONE ENCOUNTER
[facility]: [id number] - [place], located within [facility] [address] AT [facility] OF [facility] & [address] - [phone] [facility] [phone] FX<br>[facility] - [place] [address] - [phone]  - [phone] FX     Pharmacy representative name: GISSELLE    Pharmacy representative phone number: [phone]    What medication are you calling in regards to: PROGESTERONE    What question does the pharmacy have: PATIENT NEEDS REFILLS AND THERE WAS A CHANGE REQUESTED ON THE FAX THAT WAS SENT OVER TO THE OFFICE.    Who is the provider that prescribed the medication: TONEY CONNELL

## 2020-12-18 DIAGNOSIS — E34.9 HORMONE IMBALANCE: ICD-10-CM

## 2020-12-18 RX ORDER — PROGESTERONE 100 %
POWDER (GRAM) MISCELLANEOUS
Qty: 1 BOTTLE | Refills: 5 | Status: SHIPPED | OUTPATIENT
Start: 2020-12-18 | End: 2020-12-21 | Stop reason: SDUPTHER

## 2020-12-21 ENCOUNTER — TELEPHONE (OUTPATIENT)
Dept: INTERNAL MEDICINE | Facility: CLINIC | Age: 50
End: 2020-12-21

## 2020-12-21 DIAGNOSIS — E34.9 HORMONE IMBALANCE: ICD-10-CM

## 2020-12-21 RX ORDER — PROGESTERONE 100 %
POWDER (GRAM) MISCELLANEOUS
Qty: 1 BOTTLE | Refills: 5 | Status: SHIPPED | OUTPATIENT
Start: 2020-12-21 | End: 2021-06-16 | Stop reason: SDUPTHER

## 2020-12-21 NOTE — TELEPHONE ENCOUNTER
PHARMACY CALLED STATE THAT THEY HAVE RECEIVED RX  Progesterone powder, BUT THEY ARE NOT A [facility]OUND PH[facility]Y AND WOULD NOT BE ABLE TO FILL.    PLEASE ADVISE.  CALL BACK:[phone]

## 2021-04-16 DIAGNOSIS — K59.01 SLOW TRANSIT CONSTIPATION: Primary | ICD-10-CM

## 2021-05-03 DIAGNOSIS — E34.9 HORMONE IMBALANCE: ICD-10-CM

## 2021-05-03 RX ORDER — PROGESTERONE 100 %
POWDER (GRAM) MISCELLANEOUS
Refills: 0 | Status: CANCELLED | OUTPATIENT
Start: 2021-05-03

## 2021-05-03 NOTE — TELEPHONE ENCOUNTER
Patient asked if she could do a telemedicine visit unable to find a  to come in for office visit

## 2021-06-01 ENCOUNTER — LAB (OUTPATIENT)
Dept: LAB | Facility: HOSPITAL | Age: 51
End: 2021-06-01

## 2021-06-01 ENCOUNTER — OFFICE VISIT (OUTPATIENT)
Dept: INTERNAL MEDICINE | Facility: CLINIC | Age: 51
End: 2021-06-01

## 2021-06-01 VITALS
HEIGHT: 64 IN | WEIGHT: 129 LBS | BODY MASS INDEX: 22.02 KG/M2 | SYSTOLIC BLOOD PRESSURE: 122 MMHG | TEMPERATURE: 96.9 F | HEART RATE: 95 BPM | DIASTOLIC BLOOD PRESSURE: 68 MMHG

## 2021-06-01 DIAGNOSIS — E34.9 HORMONE IMBALANCE: ICD-10-CM

## 2021-06-01 DIAGNOSIS — E78.2 MIXED HYPERLIPIDEMIA: ICD-10-CM

## 2021-06-01 DIAGNOSIS — R73.09 ABNORMAL GLUCOSE: ICD-10-CM

## 2021-06-01 DIAGNOSIS — Z00.00 ROUTINE MEDICAL EXAM: Primary | ICD-10-CM

## 2021-06-01 DIAGNOSIS — H53.9 VISUAL CHANGES: ICD-10-CM

## 2021-06-01 DIAGNOSIS — Z13.21 ENCOUNTER FOR VITAMIN DEFICIENCY SCREENING: ICD-10-CM

## 2021-06-01 DIAGNOSIS — K59.01 SLOW TRANSIT CONSTIPATION: ICD-10-CM

## 2021-06-01 LAB
BASOPHILS # BLD AUTO: 0.04 10*3/MM3 (ref 0–0.2)
BASOPHILS NFR BLD AUTO: 0.7 % (ref 0–1.5)
DEPRECATED RDW RBC AUTO: 42.2 FL (ref 37–54)
EOSINOPHIL # BLD AUTO: 0.1 10*3/MM3 (ref 0–0.4)
EOSINOPHIL NFR BLD AUTO: 1.7 % (ref 0.3–6.2)
ERYTHROCYTE [DISTWIDTH] IN BLOOD BY AUTOMATED COUNT: 12.1 % (ref 12.3–15.4)
HCT VFR BLD AUTO: 46.4 % (ref 34–46.6)
HGB BLD-MCNC: 15.6 G/DL (ref 12–15.9)
IMM GRANULOCYTES # BLD AUTO: 0.01 10*3/MM3 (ref 0–0.05)
IMM GRANULOCYTES NFR BLD AUTO: 0.2 % (ref 0–0.5)
LYMPHOCYTES # BLD AUTO: 2.13 10*3/MM3 (ref 0.7–3.1)
LYMPHOCYTES NFR BLD AUTO: 35.7 % (ref 19.6–45.3)
MCH RBC QN AUTO: 32 PG (ref 26.6–33)
MCHC RBC AUTO-ENTMCNC: 33.6 G/DL (ref 31.5–35.7)
MCV RBC AUTO: 95.3 FL (ref 79–97)
MONOCYTES # BLD AUTO: 0.54 10*3/MM3 (ref 0.1–0.9)
MONOCYTES NFR BLD AUTO: 9.1 % (ref 5–12)
NEUTROPHILS NFR BLD AUTO: 3.14 10*3/MM3 (ref 1.7–7)
NEUTROPHILS NFR BLD AUTO: 52.6 % (ref 42.7–76)
NRBC BLD AUTO-RTO: 0 /100 WBC (ref 0–0.2)
PLATELET # BLD AUTO: 315 10*3/MM3 (ref 140–450)
PMV BLD AUTO: 10.8 FL (ref 6–12)
RBC # BLD AUTO: 4.87 10*6/MM3 (ref 3.77–5.28)
WBC # BLD AUTO: 5.96 10*3/MM3 (ref 3.4–10.8)

## 2021-06-01 PROCEDURE — 80053 COMPREHEN METABOLIC PANEL: CPT

## 2021-06-01 PROCEDURE — 84144 ASSAY OF PROGESTERONE: CPT

## 2021-06-01 PROCEDURE — 80061 LIPID PANEL: CPT

## 2021-06-01 PROCEDURE — 84439 ASSAY OF FREE THYROXINE: CPT

## 2021-06-01 PROCEDURE — 84402 ASSAY OF FREE TESTOSTERONE: CPT

## 2021-06-01 PROCEDURE — 99396 PREV VISIT EST AGE 40-64: CPT | Performed by: PHYSICIAN ASSISTANT

## 2021-06-01 PROCEDURE — 82607 VITAMIN B-12: CPT

## 2021-06-01 PROCEDURE — 82670 ASSAY OF TOTAL ESTRADIOL: CPT

## 2021-06-01 PROCEDURE — 85025 COMPLETE CBC W/AUTO DIFF WBC: CPT

## 2021-06-01 PROCEDURE — 82306 VITAMIN D 25 HYDROXY: CPT

## 2021-06-01 PROCEDURE — 84403 ASSAY OF TOTAL TESTOSTERONE: CPT

## 2021-06-01 PROCEDURE — 84443 ASSAY THYROID STIM HORMONE: CPT

## 2021-06-01 NOTE — PROGRESS NOTES
Patient Care Team:  Angelica Lynch PA-C as PCP - General (Internal Medicine)    Chief Complaint::   Chief Complaint   Patient presents with   • Annual Exam   • Irritable Bowel Syndrome        Subjective     HPI  Ivette is a 50 year old female with history of hormone imbalance, hyperlipidemia, IBS, who presents for annual exam.  She has remained busy throughout the coronavirus pandemic as a  for [employer]. She has history of IBS, now regulated with low dose linzess.  She finds that a 72 mcg capsule works well for her.  She denies stomach upset or bloat.  She has lost 5 pounds over the past year with healthy diet and exercise.  Hormone compounded through [facility]-had episode of irregular menses, but started progesterone daily.  She denies irregular menses, pelvic pain, or vaginal discharge.        The following portions of the patient's history were reviewed and updated as appropriate: active problem list, medication list, allergies, family history, social history    Review of Systems:   Review of Systems   Constitutional: Negative for activity change, appetite change, diaphoresis, fatigue, unexpected weight gain and unexpected weight loss.   HENT: Negative for hearing loss.    Eyes: Negative for visual disturbance.   Respiratory: Negative for chest tightness and shortness of breath.    Cardiovascular: Negative for chest pain, palpitations and leg swelling.   Gastrointestinal: Negative for abdominal pain, blood in stool, GERD and indigestion.   Endocrine: Negative for cold intolerance and heat intolerance.   Genitourinary: Negative for dysuria and hematuria.   Musculoskeletal: Negative for arthralgias and myalgias.   Skin: Negative for skin lesions.   Neurological: Negative for tremors, seizures, syncope, speech difficulty, weakness, headache, memory problem and confusion.   Hematological: Does not bruise/bleed easily.   Psychiatric/Behavioral: Positive for decreased concentration.  "Negative for agitation, behavioral problems, dysphoric mood, sleep disturbance, negative for hyperactivity and depressed mood. The patient is not nervous/anxious.        Vital Signs  Vitals:    06/01/21 1456   BP: 122/68   BP Location: Left arm   Patient Position: Sitting   Cuff Size: Adult   Pulse: 95   Temp: 96.9 °F (36.1 °C)   TempSrc: Temporal   Weight: 58.5 kg (129 lb)   Height: 162.6 cm (64.02\")   PainSc: 0-No pain     Body mass index is 22.13 kg/m².    Labs  Lab on 06/01/2021   Component Date Value Ref Range Status   • Glucose 06/01/2021 88  65 - 99 mg/dL Final   • BUN 06/01/2021 11  6 - 20 mg/dL Final   • Creatinine 06/01/2021 0.76  0.57 - 1.00 mg/dL Final   • Sodium 06/01/2021 135* 136 - 145 mmol/L Final   • Potassium 06/01/2021 3.5  3.5 - 5.2 mmol/L Final   • Chloride 06/01/2021 98  98 - 107 mmol/L Final   • CO2 06/01/2021 24.5  22.0 - 29.0 mmol/L Final   • Calcium 06/01/2021 9.5  8.6 - 10.5 mg/dL Final   • Total Protein 06/01/2021 7.5  6.0 - 8.5 g/dL Final   • Albumin 06/01/2021 4.90  3.50 - 5.20 g/dL Final   • ALT (SGPT) 06/01/2021 26  1 - 33 U/L Final   • AST (SGOT) 06/01/2021 23  1 - 32 U/L Final   • Alkaline Phosphatase 06/01/2021 37* 39 - 117 U/L Final   • Total Bilirubin 06/01/2021 0.4  0.0 - 1.2 mg/dL Final   • eGFR Non  Amer 06/01/2021 81  >60 mL/min/1.73 Final   • Globulin 06/01/2021 2.6  gm/dL Final   • A/G Ratio 06/01/2021 1.9  g/dL Final   • BUN/Creatinine Ratio 06/01/2021 14.5  7.0 - 25.0 Final   • Anion Gap 06/01/2021 12.5  5.0 - 15.0 mmol/L Final   • Total Cholesterol 06/01/2021 239* 0 - 200 mg/dL Final   • Triglycerides 06/01/2021 70  0 - 150 mg/dL Final   • HDL Cholesterol 06/01/2021 83* 40 - 60 mg/dL Final   • LDL Cholesterol  06/01/2021 144* 0 - 100 mg/dL Final   • VLDL Cholesterol 06/01/2021 12  5 - 40 mg/dL Final   • LDL/HDL Ratio 06/01/2021 1.71   Final   • TSH 06/01/2021 2.530  0.270 - 4.200 uIU/mL Final   • Free T4 06/01/2021 0.97  0.93 - 1.70 ng/dL Final   • Vitamin B-12 " 06/01/2021 873  211 - 946 pg/mL Final   • 25 Hydroxy, Vitamin D 06/01/2021 82.8  30.0 - 100.0 ng/ml Final   • Estradiol 06/01/2021 42.1  pg/mL Final   • Progesterone 06/01/2021 0.46  ng/mL Final   • WBC 06/01/2021 5.96  3.40 - 10.80 10*3/mm3 Final   • RBC 06/01/2021 4.87  3.77 - 5.28 10*6/mm3 Final   • Hemoglobin 06/01/2021 15.6  12.0 - 15.9 g/dL Final   • Hematocrit 06/01/2021 46.4  34.0 - 46.6 % Final   • MCV 06/01/2021 95.3  79.0 - 97.0 fL Final   • MCH 06/01/2021 32.0  26.6 - 33.0 pg Final   • MCHC 06/01/2021 33.6  31.5 - 35.7 g/dL Final   • RDW 06/01/2021 12.1* 12.3 - 15.4 % Final   • RDW-SD 06/01/2021 42.2  37.0 - 54.0 fl Final   • MPV 06/01/2021 10.8  6.0 - 12.0 fL Final   • Platelets 06/01/2021 315  140 - 450 10*3/mm3 Final   • Neutrophil % 06/01/2021 52.6  42.7 - 76.0 % Final   • Lymphocyte % 06/01/2021 35.7  19.6 - 45.3 % Final   • Monocyte % 06/01/2021 9.1  5.0 - 12.0 % Final   • Eosinophil % 06/01/2021 1.7  0.3 - 6.2 % Final   • Basophil % 06/01/2021 0.7  0.0 - 1.5 % Final   • Immature Grans % 06/01/2021 0.2  0.0 - 0.5 % Final   • Neutrophils, Absolute 06/01/2021 3.14  1.70 - 7.00 10*3/mm3 Final   • Lymphocytes, Absolute 06/01/2021 2.13  0.70 - 3.10 10*3/mm3 Final   • Monocytes, Absolute 06/01/2021 0.54  0.10 - 0.90 10*3/mm3 Final   • Eosinophils, Absolute 06/01/2021 0.10  0.00 - 0.40 10*3/mm3 Final   • Basophils, Absolute 06/01/2021 0.04  0.00 - 0.20 10*3/mm3 Final   • Immature Grans, Absolute 06/01/2021 0.01  0.00 - 0.05 10*3/mm3 Final   • nRBC 06/01/2021 0.0  0.0 - 0.2 /100 WBC Final       Imaging  No radiology results for the last 30 days.      Current Outpatient Medications:   •  Biotin 10 MG capsule, Take 1 tablet by mouth., Disp: , Rfl:   •  cetirizine (zyrTEC) 10 MG tablet, Take 10 mg by mouth Daily., Disp: , Rfl:   •  cholecalciferol (VITAMIN D3) 1000 units tablet, Take 1,000 Units by mouth Daily., Disp: , Rfl:   •  Cream Base cream, Compound testosterone 0.5mg topical cream QD, Disp: 30 g, Rfl:  5  •  Cyanocobalamin (B-12) 1000 MCG capsule, Take 1 tablet by mouth Daily., Disp: , Rfl:   •  Digestive Enzymes (DIGESTIVE ENZYME PO), Take 1 capsule by mouth Daily., Disp: , Rfl:   •  linaclotide (LINZESS) 72 MCG capsule capsule, Take 1 capsule by mouth Every Morning Before Breakfast., Disp: 90 capsule, Rfl: 3  •  Omega-3 Fatty Acids (FISH OIL) 1000 MG capsule capsule, Take  by mouth Daily With Breakfast., Disp: , Rfl:   •  Probiotic Product (PROBIOTIC-10) capsule, Take 1 capsule by mouth Daily., Disp: , Rfl:   •  Progesterone powder, Compounded progesterone slow release capsules: 50mg on days 1 through 25 of cycle, Disp: 1 bottle, Rfl: 5    Physical Exam:    Physical Exam  Vitals reviewed.   Constitutional:       Appearance: Normal appearance. She is well-developed.   HENT:      Head: Normocephalic and atraumatic.      Right Ear: Hearing, tympanic membrane, ear canal and external ear normal.      Left Ear: Hearing, tympanic membrane, ear canal and external ear normal.      Nose: Nose normal.      Mouth/Throat:      Pharynx: Uvula midline.   Eyes:      General: Lids are normal.      Conjunctiva/sclera: Conjunctivae normal.      Pupils: Pupils are equal, round, and reactive to light.   Cardiovascular:      Rate and Rhythm: Normal rate and regular rhythm.      Heart sounds: Normal heart sounds.   Pulmonary:      Effort: Pulmonary effort is normal.      Breath sounds: Normal breath sounds.   Abdominal:      General: Bowel sounds are normal.      Palpations: Abdomen is soft.   Musculoskeletal:         General: Normal range of motion.      Cervical back: Full passive range of motion without pain, normal range of motion and neck supple.   Skin:     General: Skin is warm and dry.   Neurological:      General: No focal deficit present.      Mental Status: She is alert and oriented to person, place, and time. Mental status is at baseline.      Deep Tendon Reflexes: Reflexes are normal and symmetric.   Psychiatric:          Speech: Speech normal.         Behavior: Behavior normal.         Thought Content: Thought content normal.         Judgment: Judgment normal.     The 10-year ASCVD risk score (Ladanrussell CHANDLER Jr., et al., 2013) is: 0.9%    Values used to calculate the score:      Age: 50 years      Sex: Female      Is Non- : No      Diabetic: No      Tobacco smoker: No      Systolic Blood Pressure: 122 mmHg      Is BP treated: No      HDL Cholesterol: 83 mg/dL      Total Cholesterol: 239 mg/dL        ECG 12 Lead    Date/Time: 6/4/2021 8:39 PM  Performed by: Angelica Lynch PA-C  Authorized by: Angelica Lynch PA-C   Comparison: not compared with previous ECG   Previous ECG: no previous ECG available  Rhythm: sinus bradycardia    Clinical impression: normal ECG                Assessment/Plan   Problem List Items Addressed This Visit        Cardiac and Vasculature    Mixed hyperlipidemia    Overview     Continue low fat, low cholesterol diet.   Total cholesterol: 239  TRG 70  HDL 83           Relevant Orders    Lipid Panel (Completed)    ECG 12 Lead       Endocrine and Metabolic    Hormone imbalance    Relevant Medications    Progesterone powder    Other Relevant Orders    CBC & Differential (Completed)    TSH (Completed)    T4, Free (Completed)    Estradiol (Completed)    Progesterone (Completed)    Testosterone (Free & Total), LC / MS    Abnormal glucose    Relevant Orders    Comprehensive Metabolic Panel (Completed)    CBC & Differential    Comprehensive Metabolic Panel    Encounter for vitamin deficiency screening    Relevant Orders    Vitamin B12 (Completed)    Vitamin D 25 Hydroxy (Completed)       Eye    Visual changes    Relevant Orders    Ambulatory Referral to Ophthalmology       Gastrointestinal Abdominal     Slow transit constipation    Overview     Symptoms significanlty improved with  linzess 72mcg daily.         Relevant Medications    linaclotide (LINZESS) 72 MCG capsule capsule        Health Encounters    Routine medical exam - Primary    Overview     EKG reviewed with patient.  Normal sinus rhythm.  Order for fasting labs placed.  Patient encouraged to continue healthy eating and regular cardiovascular exercise.  She is current on Covid vaccinations.             Patient Wellness Counseling:   Plan of care reviewed with patient at the conclusion of today's visit. Education was provided in regards to diagnosis, diet and exercise, cervical cancer screening, self breast exams, breast cancer screening, and the importance of yearly mammograms.   Nutrition,physical activity, healthy weight,ways to reduce stress, adequate sleep, injury prevention, dental health, mental health, and immunizations.    Management and any prescribed or recommended OTC medications.  Patient verbalizes understanding of and agreement with management plan.    Return in about 6 months (around 12/1/2021) for Recheck.    Plan of care reviewed with patient at the conclusion of today's visit. Education was provided regarding diagnosis, management, and any prescribed or recommended OTC medications.Patient verbalizes understanding of and agreement with management plan.       Angelica Lynch PA-C    Please note that portions of this note were completed with a voice recognition program. Efforts were made to edit the dictations, but occasionally words are mistranscribed.

## 2021-06-02 LAB
25(OH)D3 SERPL-MCNC: 82.8 NG/ML (ref 30–100)
ALBUMIN SERPL-MCNC: 4.9 G/DL (ref 3.5–5.2)
ALBUMIN/GLOB SERPL: 1.9 G/DL
ALP SERPL-CCNC: 37 U/L (ref 39–117)
ALT SERPL W P-5'-P-CCNC: 26 U/L (ref 1–33)
ANION GAP SERPL CALCULATED.3IONS-SCNC: 12.5 MMOL/L (ref 5–15)
AST SERPL-CCNC: 23 U/L (ref 1–32)
BILIRUB SERPL-MCNC: 0.4 MG/DL (ref 0–1.2)
BUN SERPL-MCNC: 11 MG/DL (ref 6–20)
BUN/CREAT SERPL: 14.5 (ref 7–25)
CALCIUM SPEC-SCNC: 9.5 MG/DL (ref 8.6–10.5)
CHLORIDE SERPL-SCNC: 98 MMOL/L (ref 98–107)
CHOLEST SERPL-MCNC: 239 MG/DL (ref 0–200)
CO2 SERPL-SCNC: 24.5 MMOL/L (ref 22–29)
CREAT SERPL-MCNC: 0.76 MG/DL (ref 0.57–1)
ESTRADIOL SERPL HS-MCNC: 42.1 PG/ML
GFR SERPL CREATININE-BSD FRML MDRD: 81 ML/MIN/1.73
GLOBULIN UR ELPH-MCNC: 2.6 GM/DL
GLUCOSE SERPL-MCNC: 88 MG/DL (ref 65–99)
HDLC SERPL-MCNC: 83 MG/DL (ref 40–60)
LDLC SERPL CALC-MCNC: 144 MG/DL (ref 0–100)
LDLC/HDLC SERPL: 1.71 {RATIO}
POTASSIUM SERPL-SCNC: 3.5 MMOL/L (ref 3.5–5.2)
PROGEST SERPL-MCNC: 0.46 NG/ML
PROT SERPL-MCNC: 7.5 G/DL (ref 6–8.5)
SODIUM SERPL-SCNC: 135 MMOL/L (ref 136–145)
T4 FREE SERPL-MCNC: 0.97 NG/DL (ref 0.93–1.7)
TRIGL SERPL-MCNC: 70 MG/DL (ref 0–150)
TSH SERPL DL<=0.05 MIU/L-ACNC: 2.53 UIU/ML (ref 0.27–4.2)
VIT B12 BLD-MCNC: 873 PG/ML (ref 211–946)
VLDLC SERPL-MCNC: 12 MG/DL (ref 5–40)

## 2021-06-04 PROCEDURE — 93000 ELECTROCARDIOGRAM COMPLETE: CPT | Performed by: PHYSICIAN ASSISTANT

## 2021-06-05 LAB
TESTOST FREE SERPL-MCNC: 1.3 PG/ML (ref 0–4.2)
TESTOST SERPL-MCNC: 29.2 NG/DL

## 2021-06-10 DIAGNOSIS — E34.9 HORMONE IMBALANCE: ICD-10-CM

## 2021-06-10 RX ORDER — EMOLLIENT BASE
CREAM (GRAM) TOPICAL
Qty: 30 G | Refills: 5 | Status: SHIPPED | OUTPATIENT
Start: 2021-06-10 | End: 2021-06-11 | Stop reason: SDUPTHER

## 2021-06-10 NOTE — TELEPHONE ENCOUNTER
This is for testosterone 0.5mg/0.5mL versabase cream: Apply two clicks t inner arm or thigh every day.

## 2021-06-11 ENCOUNTER — TELEPHONE (OUTPATIENT)
Dept: INTERNAL MEDICINE | Facility: CLINIC | Age: 51
End: 2021-06-11

## 2021-06-11 DIAGNOSIS — E34.9 HORMONE IMBALANCE: Primary | ICD-10-CM

## 2021-06-11 RX ORDER — EMOLLIENT BASE
CREAM (GRAM) TOPICAL
Qty: 30 G | Refills: 5 | Status: SHIPPED | OUTPATIENT
Start: 2021-06-11 | End: 2021-06-16 | Stop reason: SDUPTHER

## 2021-06-11 NOTE — TELEPHONE ENCOUNTER
Brandy from [facility] called back and stated the prescription was sent over incorrect.   The rx should be for testosterone 0.5mg/0.5mL versabase cream.    It was sent over as .5mg per 1mg and this is incorrect.

## 2021-06-11 NOTE — TELEPHONE ENCOUNTER
Caller: [id number] - [address] [facility] [address] - [phone]  - [phone] FX    Relationship to patient: Pharmacy    Best call back number: [phone]    Patient is needing: [facility] SAID THEY RECEIVED A SCRIPT FOR CREAM BASE CREAM.  THEY SAID THEY ARE UNABLE TO FILL IT DUE IT NEEDING TO BE COMPOUNDED.  HE SAID [facility] DO[facility] SHOULD BE ABLE TO DO IT BUT WOULD NEED A NEW SCRIPT AS HE IS UNABLE TO TRANSFER.

## 2021-06-11 NOTE — TELEPHONE ENCOUNTER
Caller: [facility] - [place] [address] - [phone]  - [phone] FX    Relationship: Pharmacy    Best call back number: [phone]    Medication needed:    testosterone 0.5mg/0.5mL versabase cream    When do you need the refill by: 06/11/2021    What additional details did the patient provide when requesting the medication:   GISSELLE FROM [facility] STATED THAT PATIENT CALLED THE PHARMACY REGARDING MEDICATION AND STATED THAT THE PRESCRIPTION SH[facility]     Does the patient have less than a 3 day supply:  [x] Yes  [] No    What is the patient's preferred pharmacy:     [facility] [address] [facility] - [place], [place] - [address] - [phone] [facility] [phone] FX  [phone]

## 2021-06-13 NOTE — TELEPHONE ENCOUNTER
Please call [facility] and have them send over a request for the prescription so that we can sign and fax back.

## 2021-06-14 NOTE — TELEPHONE ENCOUNTER
Called and spoke with Brandy at [facility]. She states that she will resend prescription via fax to our office so we can sign and fax back. She verbalized understanding and had no further questions.

## 2021-06-16 DIAGNOSIS — E34.9 HORMONE IMBALANCE: ICD-10-CM

## 2021-06-16 RX ORDER — EMOLLIENT BASE
CREAM (GRAM) TOPICAL
Qty: 30 G | Refills: 5 | Status: CANCELLED | OUTPATIENT
Start: 2021-06-16

## 2021-06-16 RX ORDER — EMOLLIENT BASE
CREAM (GRAM) TOPICAL
Qty: 15 G | Refills: 4 | OUTPATIENT
Start: 2021-06-16

## 2021-06-16 NOTE — TELEPHONE ENCOUNTER
----- Message from Ivette Warren sent at 6/16/2021  3:22 PM EDT -----  Regarding: Prescription Question  Contact: [phone]  Seb GallardoAzalea,    My prescriptions were sent to the wrong  pharmacy! I need both progesterone and testosterone to be refilled. The only change it the 50 mg progesterone. I will stay on all the time and I don’t take a stronger one or stop taking in the middle for 5 days. [facility] was going to send the corrected one on progesterone.   Also, can I get tested for my FSH or did I get that result and didn’t see it? Maybe we could talk  about my results anyway bc I’m concerned about the ECG results!   Talk with you soon,   Ivette Warren.

## 2021-06-17 RX ORDER — PROGESTERONE 100 %
POWDER (GRAM) MISCELLANEOUS
Qty: 30 G | Refills: 5 | Status: SHIPPED | OUTPATIENT
Start: 2021-06-17 | End: 2021-09-24 | Stop reason: SDUPTHER

## 2021-06-17 RX ORDER — EMOLLIENT BASE
CREAM (GRAM) TOPICAL
Qty: 30 G | Refills: 5 | Status: SHIPPED | OUTPATIENT
Start: 2021-06-17 | End: 2021-09-24 | Stop reason: SDUPTHER

## 2021-06-17 NOTE — TELEPHONE ENCOUNTER
Can you write this down on a prescription and give this to me to sign and fax to [facility]?  I have received multiple messages to renew this prescription and there have been multiple issues with it.

## 2021-06-17 NOTE — TELEPHONE ENCOUNTER
The first issue was it went to the wrong pharmacy  Second issue it had the wrong strength  I called the pharmacy and spoke with Brandy yesterday and corrected rx so it is now correct.

## 2021-07-15 DIAGNOSIS — R00.2 PALPITATIONS: Primary | ICD-10-CM

## 2021-07-21 ENCOUNTER — OFFICE VISIT (OUTPATIENT)
Dept: CARDIOLOGY | Facility: HOSPITAL | Age: 51
End: 2021-07-21

## 2021-07-21 ENCOUNTER — HOSPITAL ENCOUNTER (OUTPATIENT)
Dept: CARDIOLOGY | Facility: HOSPITAL | Age: 51
Discharge: HOME | End: 2021-07-21

## 2021-07-21 VITALS
HEIGHT: 64 IN | WEIGHT: 135 LBS | RESPIRATION RATE: 20 BRPM | SYSTOLIC BLOOD PRESSURE: 108 MMHG | OXYGEN SATURATION: 99 % | TEMPERATURE: 98.1 F | BODY MASS INDEX: 23.05 KG/M2 | HEART RATE: 80 BPM | DIASTOLIC BLOOD PRESSURE: 67 MMHG

## 2021-07-21 DIAGNOSIS — R00.2 PALPITATIONS: ICD-10-CM

## 2021-07-21 DIAGNOSIS — R00.2 PALPITATIONS: Primary | ICD-10-CM

## 2021-07-21 PROCEDURE — 99213 OFFICE O/P EST LOW 20 MIN: CPT | Performed by: NURSE PRACTITIONER

## 2021-07-21 PROCEDURE — 93246 EXT ECG>7D<15D RECORDING: CPT

## 2021-07-21 NOTE — PROGRESS NOTES
[facility] Heart Monitor Documentation    Ivette Warren  1970  [id number]  07/21/21    HERMANN Mendez    [] ZIO XT Patch  Model [id number] Prescribed for N/A Days    · Serial Number: (N + 9 Digits) N   · Apply-By Date on Box:   · USPS Tracking Number:   · USPS Tracking        [] [employer] BodyGuardian MINI PLUS Mobile Cardiac Telemetry  Model BGMINIPLUS Prescribed for N/A Days    · Serial Number: (BGM + 7 Digits) BGM  · Shipped-By Date on Box:   · UPS Tracking Number: [id number]  · UPS Tracking      [] [employer] Body[employer]ian MINI Holter Monitor  Model BGMINIEL Prescribed for 7 Days    · Serial Number: (7 Digits) [id number]  · Shipped-By Date on Box: 251326  · UPS Tracking Number: [id number]  · UPS Tracking        This monitor was applied to the patient's chest and checked for proper functioning.  Ms. Ivette Warren was instructed in the proper use of this monitor.  She was given the opportunity to ask questions and left the office with the device 's instruction manual.    Veronica Zuluaga MA, 08:36 EDT, 07/21/21                  [facility] 8[facility]

## 2021-07-21 NOTE — PROGRESS NOTES
"Chief Complaint  Palpitations and Establish Care    Subjective    History of Present Illness {CC  Problem List  Visit  Diagnosis   Encounters  Notes  Medications  Labs  Result Review Imaging  Media :23}       History of Present Illness   50-year-old female presents the office today for ongoing evaluation of her palpitations.  She reports she has been experiencing palpitations for many years.  She notes it first started when her youngest child was 3 months old.  She wore a Holter at that time which was benign.  She reports she wore a Holter in 2018 at [facility] due to palpitations that were occurring on a more regular basis.  PVCs occurred 0.1% of the time and no PACs.  She reports over the last few weeks palpitations have occurred more regularly.  She reports intermittent dizziness and lightheadedness.  She notes that she takes Sudafed on a regular basis but stopped that for about a year without any change in her palpitations.  She reports she is very active and exercises regularly.  Objective     Vital Signs:   Vitals:    07/21/21 0802 07/21/21 0803 07/21/21 0804   BP: 115/72 107/75 108/67   BP Location: Right arm Left arm Left arm   Patient Position: Sitting Standing Sitting   Cuff Size: Adult Adult Adult   Pulse: 78 94 80   Resp:   20   Temp:  98.1 °F (36.7 °C) 98.1 °F (36.7 °C)   TempSrc:  Temporal Temporal   SpO2: 99% 99% 99%   Weight:   61.2 kg (135 lb)   Height:   162.6 cm (64\")     Body mass index is 23.17 kg/m².  Physical Exam  Vitals and nursing note reviewed.   Constitutional:       Appearance: Normal appearance.   HENT:      Head: Normocephalic.   Eyes:      Pupils: Pupils are equal, round, and reactive to light.   Cardiovascular:      Rate and Rhythm: Normal rate and regular rhythm.      Pulses: Normal pulses.      Heart sounds: Normal heart sounds. No murmur heard.     Pulmonary:      Effort: Pulmonary effort is normal.      Breath sounds: Normal breath sounds.   Abdominal:      " General: Bowel sounds are normal.      Palpations: Abdomen is soft.   Musculoskeletal:         General: Normal range of motion.      Cervical back: Normal range of motion.      Right lower leg: No edema.      Left lower leg: No edema.   Skin:     General: Skin is warm and dry.      Capillary Refill: Capillary refill takes less than 2 seconds.   Neurological:      Mental Status: She is alert and oriented to person, place, and time.   Psychiatric:         Mood and Affect: Mood normal.         Thought Content: Thought content normal.              Result Review  Data Reviewed:{ Labs  Result Review  Imaging  Med Tab  Media :23}   ECG 12 Lead (06/04/2021 20:39)  24-hour Holter May 2018 was an average heart rate of 89 bpm, PVCs 0.1%, PAC 0%, no arrhythmias noted           Assessment and Plan {CC Problem List  Visit Diagnosis  ROS  Review (Popup)  Health Maintenance  Quality  BestPractice  Medications  SmartSets  SnapShot Encounters  Media :23}   1. Palpitations  Discussed avoidance of caffeine   - Holter Monitor - 72 Hour Up To 15 Days; Future          Follow Up {Instructions Charge Capture  Follow-up Communications :23}   Return if symptoms worsen or fail to improve.    Patient was given instructions and counseling regarding her condition or for health maintenance advice. Please see specific information pulled into the AVS if appropriate.  Patient was instructed to call the [facility] with any questions, concerns, or worsening symptoms.    *Please note that portions of this note were completed with a voice recognition program. Efforts were made to edit the dictations, but occasionally words are mistranscribed.

## 2021-08-03 ENCOUNTER — TELEPHONE (OUTPATIENT)
Dept: INTERNAL MEDICINE | Facility: CLINIC | Age: 51
End: 2021-08-03

## 2021-08-03 NOTE — TELEPHONE ENCOUNTER
Caller: Ivette Warren    Relationship: Self    Best call back number: [phone]    What is the best time to reach you:ANYTIME    Who are you requesting to speak with (clinical staff, provider,  specific staff member): CLINICAL STAFF OR PROVIDER    What was the call regarding:  PATIENT IS TRYING TO FIGURE OUT IF WHAT SHE HAS IS PANCREATITIS AD WHAT SHE NEEDS TO DO    Do you require a callback: YES

## 2021-08-03 NOTE — TELEPHONE ENCOUNTER
Patient states she is out of town and has checked in to an out patient ED and they are checking her out now, states they have taken blood for labs and started her on an IV with fluids. Patient concerned that this could be from her linzess medication.

## 2021-08-21 PROCEDURE — 93248 EXT ECG>7D<15D REV&INTERPJ: CPT | Performed by: INTERNAL MEDICINE

## 2021-09-23 ENCOUNTER — TELEPHONE (OUTPATIENT)
Dept: INTERNAL MEDICINE | Facility: CLINIC | Age: 51
End: 2021-09-23

## 2021-09-23 NOTE — TELEPHONE ENCOUNTER
Caller: [facility] - [place], [place] - [facility] [address] - [phone]  - [phone] FX    Relationship: Pharmacy    Medication requested (name and dosage): Progesterone powder  WOULD LIKE TO INCREASE DOSAGE TO 75 MG ONCE IN THE EVENING     Pharmacy where request should be sent: [facility]     Best call back number: [phone]    Does the patient have less than a 3 day supply:  [] Yes  [] No    PHARMACY SAID THEY SENT A FAX TO THE OFFICE A COUPLE DAYS AGO ABOUT THIS

## 2021-09-24 DIAGNOSIS — E34.9 HORMONE IMBALANCE: ICD-10-CM

## 2021-09-24 RX ORDER — PROGESTERONE 100 %
POWDER (GRAM) MISCELLANEOUS
Qty: 30 G | Refills: 5 | Status: SHIPPED | OUTPATIENT
Start: 2021-09-24 | End: 2021-11-04 | Stop reason: ALTCHOICE

## 2021-09-24 RX ORDER — EMOLLIENT BASE
CREAM (GRAM) TOPICAL
Qty: 30 G | Refills: 5 | Status: SHIPPED | OUTPATIENT
Start: 2021-09-24 | End: 2021-11-04 | Stop reason: ALTCHOICE

## 2021-09-24 NOTE — TELEPHONE ENCOUNTER
----- Message from Aneglica Lynhc PA-C sent at 9/23/2021  5:33 PM EDT -----      ----- Message -----  From: Brissa Castrejon RegSched Rep  Sent: 9/23/2021   8:23 AM EDT  To: Angelica Lynch PA-C

## 2021-11-04 ENCOUNTER — TELEPHONE (OUTPATIENT)
Dept: INTERNAL MEDICINE | Facility: CLINIC | Age: 51
End: 2021-11-04

## 2021-11-04 DIAGNOSIS — E34.9 HORMONE IMBALANCE: ICD-10-CM

## 2021-11-04 DIAGNOSIS — L85.3 DRY SKIN: ICD-10-CM

## 2021-11-04 RX ORDER — EMOLLIENT BASE
CREAM (GRAM) TOPICAL
Qty: 30 G | Refills: 5 | Status: CANCELLED | OUTPATIENT
Start: 2021-11-04

## 2021-11-29 DIAGNOSIS — R61 HYPERHIDROSIS: Primary | ICD-10-CM

## 2021-12-02 ENCOUNTER — TELEPHONE (OUTPATIENT)
Dept: INTERNAL MEDICINE | Facility: CLINIC | Age: 51
End: 2021-12-02

## 2022-01-05 ENCOUNTER — TELEPHONE (OUTPATIENT)
Dept: INTERNAL MEDICINE | Facility: CLINIC | Age: 52
End: 2022-01-05

## 2022-01-05 NOTE — TELEPHONE ENCOUNTER
Pharmacy Name: [facility] - [place], [place] - [facility] [address] - [phone]  - [phone] FX     Pharmacy representative name: GISSELLE    Pharmacy representative phone number: [phone]    What medication are you calling in regards to: ESTRIOL ESTETROL AND TESTOSTERONE CREAM     What question does the pharmacy have: PHARMACY STATED THAT THE CREAMS NEED TO      Who is the provider that prescribed the medication: SONIA CONNELL    Additional notes: PLEASE CALL PHARMACY FOR CLARIFICATION

## 2022-01-10 ENCOUNTER — TELEPHONE (OUTPATIENT)
Dept: INTERNAL MEDICINE | Facility: CLINIC | Age: 52
End: 2022-01-10

## 2022-01-10 NOTE — TELEPHONE ENCOUNTER
Received request from [facility] to separate compounding creams.  Need clarification on what each prescription should say including strength, duration, and quantity and load to encounter.      The paperwork received and phone call received definitely need clarification.  Thank you!

## 2022-01-24 RX ORDER — AZITHROMYCIN 250 MG/1
TABLET, FILM COATED ORAL
Qty: 6 TABLET | Refills: 0 | Status: SHIPPED | OUTPATIENT
Start: 2022-01-24 | End: 2022-01-24 | Stop reason: SDUPTHER

## 2022-02-07 ENCOUNTER — TELEPHONE (OUTPATIENT)
Dept: INTERNAL MEDICINE | Facility: CLINIC | Age: 52
End: 2022-02-07

## 2022-02-07 RX ORDER — EMOLLIENT BASE
CREAM (GRAM) TOPICAL
Qty: 15 G | Refills: 0 | Status: SHIPPED | OUTPATIENT
Start: 2022-02-07

## 2022-02-07 RX ORDER — EMOLLIENT BASE
CREAM (GRAM) TOPICAL
Qty: 15 G | Refills: 2 | Status: SHIPPED | OUTPATIENT
Start: 2022-02-07 | End: 2022-08-02 | Stop reason: SDUPTHER

## 2022-05-28 DIAGNOSIS — K59.01 SLOW TRANSIT CONSTIPATION: ICD-10-CM

## 2022-05-31 RX ORDER — LINACLOTIDE 72 UG/1
CAPSULE, GELATIN COATED ORAL
Qty: 90 CAPSULE | Refills: 3 | Status: SHIPPED | OUTPATIENT
Start: 2022-05-31 | End: 2022-11-30

## 2022-05-31 NOTE — TELEPHONE ENCOUNTER
Rx Refill Note  Requested Prescriptions     Pending Prescriptions Disp Refills   • Linzess 72 MCG capsule capsule [Pharmacy Med Name: LINZESS 72MCG CAPSULES] 90 capsule 3     Sig: TAKE 1 CAPSULE BY MOUTH EVERY MORNING BEFORE BREAKFAST      Last office visit with prescribing clinician: 6/1/2021      Next office visit with prescribing clinician: Visit date not found            Lyn Arenas RN  05/31/22, 11:04 EDT

## 2022-08-02 ENCOUNTER — TELEPHONE (OUTPATIENT)
Dept: INTERNAL MEDICINE | Facility: CLINIC | Age: 52
End: 2022-08-02

## 2022-08-02 RX ORDER — EMOLLIENT BASE
CREAM (GRAM) TOPICAL
Qty: 15 G | Refills: 2 | Status: SHIPPED | OUTPATIENT
Start: 2022-08-02 | End: 2022-08-02 | Stop reason: SDUPTHER

## 2022-08-02 RX ORDER — EMOLLIENT BASE
CREAM (GRAM) TOPICAL
Qty: 15 G | Refills: 2 | Status: CANCELLED | OUTPATIENT
Start: 2022-08-02

## 2022-08-02 RX ORDER — EMOLLIENT BASE
CREAM (GRAM) TOPICAL
Qty: 20 G | Refills: 2 | Status: SHIPPED | OUTPATIENT
Start: 2022-08-02

## 2022-08-02 NOTE — TELEPHONE ENCOUNTER
DELETE AFTER REVIEWING: Telephone encounter to be sent to the clinical pool.    Pharmacy Name:  [facility] [address]    Pharmacy representative name: BUSHRA    Pharmacy representative phone number: [phone]    What medication are you calling in regards to: CREAM BASE CREAM    What question does the pharmacy have: PHARMACY WANTS SOME CLARITY ON EXACTLY WHAT THIS PRESCRIPTION WAS FOR.     Who is the provider that prescribed the medication: TONEY CONNELL    Additional notes: PLEASE CALL TO DISCUSS.

## 2022-08-02 NOTE — TELEPHONE ENCOUNTER
Cancel that out [facility].  I mistakenly sent that to them.  I resent it to her compounding pharmacy

## 2022-09-09 ENCOUNTER — TELEPHONE (OUTPATIENT)
Dept: INTERNAL MEDICINE | Facility: CLINIC | Age: 52
End: 2022-09-09

## 2022-09-09 RX ORDER — EMOLLIENT BASE
CREAM (GRAM) TOPICAL
Qty: 30 G | Refills: 5
Start: 2022-09-09

## 2022-09-09 NOTE — TELEPHONE ENCOUNTER
Caller: GISSEL[facility]    Relationship:     Best call back number: [phone]    What medications are you currently taking:   Current Outpatient Medications on File Prior to Visit   Medication Sig Dispense Refill   • azithromycin (Zithromax Z-Allan) 250 MG tablet Take 2 tablets by mouth on day 1, then 1 tablet daily on days 2-5 6 tablet 0   • Biotin 10 MG capsule Take 1 tablet by mouth.     • cetirizine (zyrTEC) 10 MG tablet Take 10 mg by mouth Daily.     • cholecalciferol (VITAMIN D3) 1000 units tablet Take 1,000 Units by mouth Daily.     • Cream Base cream Testosterone 0.5mg: Apply 2 clicks once daily 15 g 0   • Cream Base cream Estradiol 0.5mg/ estriol 0.5mg: Apply 3 clicks once a day 20 g 2   • Cyanocobalamin (B-12) 1000 MCG capsule Take 1 tablet by mouth Daily.     • Digestive Enzymes (DIGESTIVE ENZYME PO) Take 1 capsule by mouth Daily.     • Glycopyrronium Tosylate 2.4 % pads Apply 1 pad topically Daily. 30 each 3   • Linzess 72 MCG capsule capsule TAKE 1 CAPSULE BY MOUTH EVERY MORNING BEFORE BREAKFAST 90 capsule 3   • Omega-3 Fatty Acids (FISH OIL) 1000 MG capsule capsule Take  by mouth Daily With Breakfast.     • Probiotic Product (PROBIOTIC-10) capsule Take 1 capsule by mouth Daily.     • Pseudoephedrine HCl 30 MG tablet  Take 30 mg by mouth 2 (Two) Times a Day As Needed.       No current facility-administered medications on file prior to visit.        Which medication are you concerned about: CALLED TO CHECK ON STATUS FOR RX  PROGESTERONE WHICH WAS REQUEST ON 8/2

## 2022-11-11 ENCOUNTER — TELEPHONE (OUTPATIENT)
Dept: INTERNAL MEDICINE | Facility: CLINIC | Age: 52
End: 2022-11-11

## 2022-11-15 ENCOUNTER — TELEPHONE (OUTPATIENT)
Dept: INTERNAL MEDICINE | Facility: CLINIC | Age: 52
End: 2022-11-15

## 2022-11-15 NOTE — TELEPHONE ENCOUNTER
Caller: [facility] - [place] [address] - [phone]  - [phone]    Relationship: Pharmacy    Best call back number: [phone]    Requested Prescriptions:   Requested Prescriptions      No prescriptions requested or ordered in this encounter    TESTOSTERONE CREAM  ESTRADIAL CREAM    Pharm[facility] where request should be sent[facility] [address] - [phone]  - [phone]     Additional details provided by patient: PHARMACY CALLING BACK ABOUT THESE CREAMS THAT ARE COMPOUNDED AT THEIR PHARMACY.     Does the patient have less than a 3 day supply:  [x] Yes  [] No    Cristiano Lewis Rep   11/15/22 10:39 EST

## 2022-11-29 ENCOUNTER — TELEMEDICINE (OUTPATIENT)
Dept: INTERNAL MEDICINE | Facility: CLINIC | Age: 52
End: 2022-11-29

## 2022-11-29 ENCOUNTER — TELEPHONE (OUTPATIENT)
Dept: INTERNAL MEDICINE | Facility: CLINIC | Age: 52
End: 2022-11-29

## 2022-11-29 DIAGNOSIS — J01.00 ACUTE NON-RECURRENT MAXILLARY SINUSITIS: Primary | ICD-10-CM

## 2022-11-29 DIAGNOSIS — R05.1 ACUTE COUGH: ICD-10-CM

## 2022-11-29 PROCEDURE — 99213 OFFICE O/P EST LOW 20 MIN: CPT | Performed by: PHYSICIAN ASSISTANT

## 2022-11-29 RX ORDER — AZITHROMYCIN 250 MG/1
TABLET, FILM COATED ORAL
Qty: 6 TABLET | Refills: 0 | Status: SHIPPED | OUTPATIENT
Start: 2022-11-29 | End: 2023-03-03 | Stop reason: SDUPTHER

## 2022-11-29 RX ORDER — BENZONATATE 100 MG/1
100 CAPSULE ORAL NIGHTLY PRN
Qty: 30 CAPSULE | Refills: 1 | Status: SHIPPED | OUTPATIENT
Start: 2022-11-29

## 2022-11-29 NOTE — PROGRESS NOTES
[facility] [place] [facility]    Ivette Warren  1970   [id number]      Patient Care Team:  Angelica Lynch PA-C as PCP - General (Internal Medicine)    Chief Complaint::   Chief Complaint   Patient presents with   • Sinusitis   • Sore Throat     You have chosen to receive care through a video visit. Do you consent to use a video visit with Angelica Lynch at [facility] for your medical care today? Yes   HPI  Ivette Warren is a 51-year-old female date of birth 1970 who presents today for telemedicine visit.  She is a teacher at [employer].  For the past week and a half, she has had increase in congestion.  She has been traveling helping her mother recover from knee surgery.  She has been around kids that have been diagnosed with flu and strep.  Initial symptoms included head congestion and sore throat.  She has taken Mucinex and doubled up on her decongestant without any improvement of symptoms.  She now continues to have sinus pressure with increased pressure in both ears.   Now, she has thick yellow mucus, headache, and sore throat.  She denies chest pain, shortness of breath, palpitations, or fever.      Patient Active Problem List   Diagnosis   • Fatigue   • Hormone imbalance   • Slow transit constipation   • Mixed hyperlipidemia   • Abnormal glucose   • Encounter for vitamin deficiency screening   • Visual changes   • Routine medical exam   • Acute non-recurrent maxillary sinusitis   • Acute cough        History reviewed. No pertinent past medical history.    Past Surgical History:   Procedure Laterality Date   • CHOLECYSTECTOMY  2015       Family History   Problem Relation Age of Onset   • Arthritis Mother    • Diabetes Mother    • Hyperlipidemia Mother    • Arthritis Father    • Hypertension Father    • Heart attack Father 60   • Hyperlipidemia Father    • No Known Problems Sister    • No Known Problems Brother    • Hyperlipidemia Maternal Grandmother    • Alzheimer's  disease Maternal Grandmother    • Heart attack Maternal Grandfather    • Lung disease Paternal Grandmother    • Aneurysm Paternal Grandmother    • Lung disease Paternal Grandfather    • Other Paternal Grandfather         AAA   • BRCA 1/2 Neg Hx    • Breast cancer Neg Hx    • Colon cancer Neg Hx    • Endometrial cancer Neg Hx    • Ovarian cancer Neg Hx        Social History     Socioeconomic History   • Marital status:    Tobacco Use   • Smoking status: Never   • Smokeless tobacco: Never   Substance and Sexual Activity   • Alcohol use: Yes     Comment: 1 DRINKS A FEW TIMES A WK   • Drug use: No   • Sexual activity: Defer       Allergies   Allergen Reactions   • Codeine Itching and GI Intolerance       Review of Systems   Constitutional: Positive for fatigue. Negative for activity change, appetite change, diaphoresis, unexpected weight gain and unexpected weight loss.   HENT: Positive for congestion and sinus pressure. Negative for hearing loss.    Eyes: Negative for visual disturbance.   Respiratory: Positive for cough. Negative for chest tightness and shortness of breath.    Cardiovascular: Negative for chest pain, palpitations and leg swelling.   Gastrointestinal: Negative for abdominal pain, blood in stool, GERD and indigestion.   Endocrine: Negative for cold intolerance and heat intolerance.   Genitourinary: Negative for dysuria and hematuria.   Musculoskeletal: Negative for arthralgias and myalgias.   Skin: Negative for skin lesions.   Allergic/Immunologic: Positive for environmental allergies.   Neurological: Positive for headache. Negative for tremors, seizures, syncope, speech difficulty, weakness, memory problem and confusion.   Hematological: Does not bruise/bleed easily.   Psychiatric/Behavioral: Negative for sleep disturbance and depressed mood. The patient is not nervous/anxious.         Vital Signs  There were no vitals filed for this visit.  There is no height or weight on file to calculate  BMI.  BMI is within normal parameters. No other follow-up for BMI required.         Current Outpatient Medications:   •  azithromycin (Zithromax Z-Allan) 250 MG tablet, Take 2 tablets by mouth on day 1, then 1 tablet daily on days 2-5, Disp: 6 tablet, Rfl: 0  •  benzonatate (Tessalon Perles) 100 MG capsule, Take 1 capsule by mouth At Night As Needed for Cough., Disp: 30 capsule, Rfl: 1  •  Biotin 10 MG capsule, Take 1 tablet by mouth., Disp: , Rfl:   •  cetirizine (zyrTEC) 10 MG tablet, Take 10 mg by mouth Daily., Disp: , Rfl:   •  cholecalciferol (VITAMIN D3) 1000 units tablet, Take 1,000 Units by mouth Daily., Disp: , Rfl:   •  Cream Base cream, Testosterone 0.5mg: Apply 2 clicks once daily, Disp: 15 g, Rfl: 0  •  Cream Base cream, Estradiol 0.5mg/ estriol 0.5mg: Apply 3 clicks once a day, Disp: 20 g, Rfl: 2  •  Cream Base cream, Progesterone 150mg capsule: Take 1 capsule at bedtime, Disp: 30 g, Rfl: 5  •  Cyanocobalamin (B-12) 1000 MCG capsule, Take 1 tablet by mouth Daily., Disp: , Rfl:   •  Digestive Enzymes (DIGESTIVE ENZYME PO), Take 1 capsule by mouth Daily., Disp: , Rfl:   •  Glycopyrronium Tosylate 2.4 % pads, Apply 1 pad topically Daily., Disp: 30 each, Rfl: 3  •  Omega-3 Fatty Acids (FISH OIL) 1000 MG capsule capsule, Take  by mouth Daily With Breakfast., Disp: , Rfl:   •  Probiotic Product (PROBIOTIC-10) capsule, Take 1 capsule by mouth Daily., Disp: , Rfl:   •  Pseudoephedrine HCl 30 MG tablet , Take 30 mg by mouth 2 (Two) Times a Day As Needed., Disp: , Rfl:     Physical Exam  Vitals reviewed.   Constitutional:       Appearance: Normal appearance.   Skin:     Coloration: Skin is not jaundiced or pale.   Neurological:      Mental Status: She is alert and oriented to person, place, and time. Mental status is at baseline.   Psychiatric:         Mood and Affect: Mood normal.         Behavior: Behavior normal.         Thought Content: Thought content normal.          ACE III MINI            Results Review:     No results found for this or any previous visit (from the past 672 hour(s)).  Procedures    Medication Review: Medications reviewed and noted    Social History     Socioeconomic History   • Marital status:    Tobacco Use   • Smoking status: Never   • Smokeless tobacco: Never   Substance and Sexual Activity   • Alcohol use: Yes     Comment: 1 DRINKS A FEW TIMES A WK   • Drug use: No   • Sexual activity: Defer        Assessment/Plan:    Problem List Items Addressed This Visit        ENT    Acute non-recurrent maxillary sinusitis - Primary    Overview     Continue decongestant and Mucinex as directed.  Increase water intake.  Zithromax sent to pharmacy.  Tessalon Perles for cough.         Relevant Medications    azithromycin (Zithromax Z-Allan) 250 MG tablet       Other    Acute cough    Relevant Medications    benzonatate (Tessalon Perles) 100 MG capsule    This visit has been rescheduled as a video visit to comply with patient safety concerns in accordance with CDC recommendations. Total time of discussion was 15 minutes.      Plan of care reviewed with patient at the conclusion of today's visit. Education was provided regarding diagnosis, management, and any prescribed or recommended OTC medications.Patient verbalizes understanding of and agreement with management plan.       I spent 15 minutes caring for Ivette on this date of service. This time includes time spent by me in the following activities:preparing for the visit, reviewing tests, obtaining and/or reviewing a separately obtained history, performing a medically appropriate examination and/or evaluation , counseling and educating the patient/family/caregiver, ordering medications, tests, or procedures, referring and communicating with other health care professionals  and documenting information in the medical record    Angelica Lynch PA-C      Note: Part of this note may be an electronic transcription/translation of spoken language to printed  text using the Dragon Dictation system.

## 2022-11-29 NOTE — TELEPHONE ENCOUNTER
Pt would like to know if she can schedule a video visit with you sometime today. Pt states that she has had some sinus issues over the past few days. 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 Pt states she doesn't have tonsils so she doesn't think it could be that. Please advise

## 2022-11-30 PROBLEM — R05.1 ACUTE COUGH: Status: ACTIVE | Noted: 2022-11-30

## 2022-11-30 PROBLEM — J01.00 ACUTE NON-RECURRENT MAXILLARY SINUSITIS: Status: ACTIVE | Noted: 2022-11-30

## 2022-12-09 DIAGNOSIS — R61 HYPERHIDROSIS: ICD-10-CM

## 2022-12-13 DIAGNOSIS — E34.9 HORMONE IMBALANCE: Primary | ICD-10-CM

## 2022-12-13 DIAGNOSIS — E78.2 MIXED HYPERLIPIDEMIA: ICD-10-CM

## 2022-12-13 DIAGNOSIS — R73.09 ABNORMAL GLUCOSE: ICD-10-CM

## 2022-12-13 DIAGNOSIS — Z13.21 ENCOUNTER FOR VITAMIN DEFICIENCY SCREENING: ICD-10-CM

## 2023-03-03 ENCOUNTER — TELEMEDICINE (OUTPATIENT)
Dept: INTERNAL MEDICINE | Facility: CLINIC | Age: 53
End: 2023-03-03
Payer: COMMERCIAL

## 2023-03-03 DIAGNOSIS — R05.1 ACUTE COUGH: Primary | ICD-10-CM

## 2023-03-03 DIAGNOSIS — J20.8 ACUTE BRONCHITIS DUE TO OTHER SPECIFIED ORGANISMS: ICD-10-CM

## 2023-03-03 DIAGNOSIS — J01.00 ACUTE NON-RECURRENT MAXILLARY SINUSITIS: ICD-10-CM

## 2023-03-03 PROCEDURE — 99214 OFFICE O/P EST MOD 30 MIN: CPT | Performed by: INTERNAL MEDICINE

## 2023-03-03 RX ORDER — AZITHROMYCIN 250 MG/1
TABLET, FILM COATED ORAL
Qty: 6 TABLET | Refills: 0 | Status: SHIPPED | OUTPATIENT
Start: 2023-03-03

## 2023-03-03 RX ORDER — ALBUTEROL SULFATE 90 UG/1
2 AEROSOL, METERED RESPIRATORY (INHALATION) EVERY 4 HOURS PRN
Qty: 18 G | Refills: 1 | Status: SHIPPED | OUTPATIENT
Start: 2023-03-03

## 2023-03-03 NOTE — ASSESSMENT & PLAN NOTE
- She will continue using Sudafed, Mucinex, and Nasonex nasal spray.  - She will also continue drinking lots of fluids.  - Do warm saltwater gargles as needed for sore throat.  - I sent a Z-Allan to her pharmacy and she will start it tomorrow if not starting to feel better.  - I also sent in a albuterol inhaler to use as needed for wheeze, shortness of breath, and cough.

## 2023-03-03 NOTE — PATIENT INSTRUCTIONS
Patient Instructions   Problem List Items Addressed This Visit          ENT    Acute non-recurrent maxillary sinusitis    Overview     Continue decongestant and Mucinex as directed.  Increase water intake.  Zithromax sent to pharmacy.  Tessalon Perles for cough.         Relevant Medications    azithromycin (Zithromax Z-Allan) 250 MG tablet       Pulmonary and Pneumonias    Acute bronchitis due to other specified organisms    Relevant Medications    cetirizine (zyrTEC) 10 MG tablet    Pseudoephedrine HCl 30 MG tablet     benzonatate (Tessalon Perles) 100 MG capsule    azithromycin (Zithromax Z-Allan) 250 MG tablet    albuterol sulfate  (90 Base) MCG/ACT inhaler       Other    Acute cough - Primary    Current Assessment & Plan     - She will continue using Sudafed, Mucinex, and Nasonex nasal spray.  - She will also continue drinking lots of fluids.  - Do warm saltwater gargles as needed for sore throat.  - I sent a Z-Allan to her pharmacy and she will start it tomorrow if not starting to feel better.  - I also sent in a albuterol inhaler to use as needed for wheeze, shortness of breath, and cough.         Relevant Medications    benzonatate (Tessalon Perles) 100 MG capsule

## 2023-03-03 NOTE — PROGRESS NOTES
[facility]     Ivette Warren  1970   [id number]      Patient Care Team:  Angelica Lynch PA-C as PCP - General (Internal Medicine)     You have chosen to receive care through a telehealth visit.  Do you consent to use a video/audio connection for your medical care today? Yes  Video visit accomplished by using the providers desktop computer and cell phone and the patient's cell phone at her home in contact.    CC:cough         HPI  Patient is a 52 y.o. female presents with cough, congestion, postnasal drainage. Onset of symptoms was abrupt starting 7 days ago.  Chronicity acute. Severity severe.  Symptoms are associated with loss of voice ,sore throat,  Ears congested.  Drainage clear.Myalgias, especially upper back.Also chest congestion and mild short of breath when trying to work out and wheezing.    Pertinent negatives no chills or colored sputum.   Symptoms are aggravated by lying down or trying to work out.  Symptoms improve with mucinex and sudafed and nasonex.  Context chronic allergies.  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    CHRONIC CONDITIONS      No past medical history on file.    Past Surgical History:   Procedure Laterality Date   • CHOLECYSTECTOMY  2015       Family History   Problem Relation Age of Onset   • Arthritis Mother    • Diabetes Mother    • Hyperlipidemia Mother    • Arthritis Father    • Hypertension Father    • Heart attack Father 60   • Hyperlipidemia Father    • No Known Problems Sister    • No Known Problems Brother    • Hyperlipidemia Maternal Grandmother    • Alzheimer's disease Maternal Grandmother    • Heart attack Maternal Grandfather    • Lung disease Paternal Grandmother    • Aneurysm Paternal Grandmother    • Lung disease Paternal Grandfather    • Other Paternal Grandfather         AAA   • BRCA 1/2 Neg Hx    • Breast cancer Neg Hx    • Colon cancer Neg Hx    • Endometrial cancer Neg Hx    • Ovarian cancer Neg Hx        Social  History     Socioeconomic History   • Marital status:    Tobacco Use   • Smoking status: Never   • Smokeless tobacco: Never   Substance and Sexual Activity   • Alcohol use: Yes     Comment: 1 DRINKS A FEW TIMES A WK   • Drug use: No   • Sexual activity: Defer       Allergies   Allergen Reactions   • Codeine Itching and GI Intolerance         Vital Signs  There were no vitals filed for this visit.  There is no height or weight on file to calculate BMI.  BMI is within normal parameters. No other follow-up for BMI required.        Current Outpatient Medications:   •  azithromycin (Zithromax Z-Allan) 250 MG tablet, Take 2 tablets by mouth on day 1, then 1 tablet daily on days 2-5, Disp: 6 tablet, Rfl: 0  •  albuterol sulfate  (90 Base) MCG/ACT inhaler, Inhale 2 puffs Every 4 (Four) Hours As Needed for Wheezing., Disp: 18 g, Rfl: 1  •  benzonatate (Tessalon Perles) 100 MG capsule, Take 1 capsule by mouth At Night As Needed for Cough., Disp: 30 capsule, Rfl: 1  •  Biotin 10 MG capsule, Take 1 tablet by mouth., Disp: , Rfl:   •  cetirizine (zyrTEC) 10 MG tablet, Take 10 mg by mouth Daily., Disp: , Rfl:   •  cholecalciferol (VITAMIN D3) 1000 units tablet, Take 1,000 Units by mouth Daily., Disp: , Rfl:   •  Cream Base cream, Testosterone 0.5mg: Apply 2 clicks once daily, Disp: 15 g, Rfl: 0  •  Cream Base cream, Estradiol 0.5mg/ estriol 0.5mg: Apply 3 clicks once a day, Disp: 20 g, Rfl: 2  •  Cream Base cream, Progesterone 150mg capsule: Take 1 capsule at bedtime, Disp: 30 g, Rfl: 5  •  Cyanocobalamin (B-12) 1000 MCG capsule, Take 1 tablet by mouth Daily., Disp: , Rfl:   •  Digestive Enzymes (DIGESTIVE ENZYME PO), Take 1 capsule by mouth Daily., Disp: , Rfl:   •  Glycopyrronium Tosylate 2.4 % pads, Apply 1 pad topically Daily., Disp: 30 each, Rfl: 3  •  Omega-3 Fatty Acids (FISH OIL) 1000 MG capsule capsule, Take  by mouth Daily With Breakfast., Disp: , Rfl:   •  Probiotic Product (PROBIOTIC-10) capsule, Take 1  capsule by mouth Daily., Disp: , Rfl:   •  Pseudoephedrine HCl 30 MG tablet , Take 30 mg by mouth 2 (Two) Times a Day As Needed., Disp: , Rfl:     Physical Exam:    Physical Exam  Constitutional:       Appearance: She is normal weight. She is ill-appearing. She is not toxic-appearing.   HENT:      Head: Normocephalic.   Eyes:      Extraocular Movements: Extraocular movements intact.      Conjunctiva/sclera: Conjunctivae normal.      Pupils: Pupils are equal, round, and reactive to light.   Pulmonary:      Effort: Pulmonary effort is normal.   Neurological:      General: No focal deficit present.      Mental Status: She is alert and oriented to person, place, and time.   Psychiatric:         Mood and Affect: Mood normal.         Thought Content: Thought content normal.          ACE III MINI        Results Review:    None    CMP:  Lab Results   Component Value Date    BUN 11 06/01/2021    CREATININE 0.76 06/01/2021    EGFRIFNONA 81 06/01/2021    BCR 14.5 06/01/2021     (L) 06/01/2021    K 3.5 06/01/2021    CO2 24.5 06/01/2021    CALCIUM 9.5 06/01/2021    ALBUMIN 4.90 06/01/2021    BILITOT 0.4 06/01/2021    ALKPHOS 37 (L) 06/01/2021    AST 23 06/01/2021    ALT 26 06/01/2021     HbA1c:  Lab Results   Component Value Date    HGBA1C 5.66 (H) 06/05/2019     Microalbumin:  No results found for: MICROALBUR, POCMALB, POCCREAT  Lipid Panel  Lab Results   Component Value Date    CHOL 239 (H) 06/01/2021    TRIG 70 06/01/2021    HDL 83 (H) 06/01/2021     (H) 06/01/2021    AST 23 06/01/2021    ALT 26 06/01/2021       Medication Review: Medications reviewed and noted  Patient Instructions   Problem List Items Addressed This Visit        ENT    Acute non-recurrent maxillary sinusitis    Overview     Continue decongestant and Mucinex as directed.  Increase water intake.  Zithromax sent to pharmacy.  Tessalon Perles for cough.         Relevant Medications    azithromycin (Zithromax Z-Allan) 250 MG tablet       Pulmonary and  Pneumonias    Acute bronchitis due to other specified organisms    Relevant Medications    cetirizine (zyrTEC) 10 MG tablet    Pseudoephedrine HCl 30 MG tablet     benzonatate (Tessalon Perles) 100 MG capsule    azithromycin (Zithromax Z-Allan) 250 MG tablet    albuterol sulfate  (90 Base) MCG/ACT inhaler       Other    Acute cough - Primary    Current Assessment & Plan     - She will continue using Sudafed, Mucinex, and Nasonex nasal spray.  - She will also continue drinking lots of fluids.  - Do warm saltwater gargles as needed for sore throat.  - I sent a Z-Allan to her pharmacy and she will start it tomorrow if not starting to feel better.  - I also sent in a albuterol inhaler to use as needed for wheeze, shortness of breath, and cough.         Relevant Medications    benzonatate (Tessalon Perles) 100 MG capsule          Diagnosis Plan   1. Acute cough        2. Acute bronchitis due to other specified organisms        3. Acute non-recurrent maxillary sinusitis  azithromycin (Zithromax Z-Allan) 250 MG tablet        Follow Up: She will come back to see Angelica for an annual physical in a month or 2.        Plan of care reviewed with patient at the conclusion of today's visit. Education was provided regarding diagnosis, management, and any prescribed or recommended OTC medications.Patient verbalizes understanding of and agreement with management plan.         Leticia Rasheed MD      Transcribed from ambient dictation for Leticia Rasheed MD by Daphne Fernandez.  03/03/23   10:20 EST    Patient or patient representative verbalized consent to the visit recording.  I have personally performed the services described in this document as transcribed by the above individual, and it is both accurate and complete.

## 2023-05-18 ENCOUNTER — OFFICE VISIT (OUTPATIENT)
Dept: INTERNAL MEDICINE | Facility: CLINIC | Age: 53
End: 2023-05-18
Payer: COMMERCIAL

## 2023-05-18 VITALS
TEMPERATURE: 98.6 F | DIASTOLIC BLOOD PRESSURE: 60 MMHG | SYSTOLIC BLOOD PRESSURE: 116 MMHG | BODY MASS INDEX: 22.36 KG/M2 | HEIGHT: 64 IN | WEIGHT: 131 LBS | HEART RATE: 88 BPM | OXYGEN SATURATION: 96 %

## 2023-05-18 DIAGNOSIS — Z00.00 ROUTINE MEDICAL EXAM: Primary | ICD-10-CM

## 2023-05-18 DIAGNOSIS — Z13.21 ENCOUNTER FOR VITAMIN DEFICIENCY SCREENING: ICD-10-CM

## 2023-05-18 DIAGNOSIS — E78.2 MIXED HYPERLIPIDEMIA: ICD-10-CM

## 2023-05-18 DIAGNOSIS — Z12.31 ENCOUNTER FOR SCREENING MAMMOGRAM FOR MALIGNANT NEOPLASM OF BREAST: ICD-10-CM

## 2023-05-18 DIAGNOSIS — K59.01 SLOW TRANSIT CONSTIPATION: ICD-10-CM

## 2023-05-18 DIAGNOSIS — R73.09 ABNORMAL GLUCOSE: ICD-10-CM

## 2023-05-18 DIAGNOSIS — E34.9 HORMONE IMBALANCE: ICD-10-CM

## 2023-05-18 NOTE — PROGRESS NOTES
[facility] Internal Medicine    Ivette Warren  1970   [id number]      Patient Care Team:  Angelica Lynch PA-C as PCP - General (Internal Medicine)    Chief Complaint::   Chief Complaint   Patient presents with   • Annual Exam   • Labs          HPI  Ivette Warren is a 52 year old female date of birth 1970 who presents for routine annual exam. Past medical history significant for hyperlipidemia and hormone imbalance.  She reports her most recent labs were performed 4/13/2023 by Dr. Ellis.   Her fasting blood glucose was 102. Her total cholesterol 243 and , HDL 72. .   A1C 5.6% borderline.   She feels that she has a form of metabolic syndrome.  Cortisol level was normal, but it has been high in the past. She does not have menstrual cycles anymore because she is on HRT, which is managed through [facility].   She is now using progesterone pills 150mg nightly.  She is on 1.5mg of estradiol of cream daily.  Testosterone cream 1.5mg daily.   She has started metformin 250mg nightly.     Patient Active Problem List   Diagnosis   • Fatigue   • Hormone imbalance   • Slow transit constipation   • Mixed hyperlipidemia   • Abnormal glucose   • Encounter for vitamin deficiency screening   • Visual changes   • Routine medical exam   • Acute non-recurrent maxillary sinusitis   • Acute cough   • Acute bronchitis due to other specified organisms   • Encounter for screening mammogram for malignant neoplasm of breast        No past medical history on file.    Past Surgical History:   Procedure Laterality Date   • CHOLECYSTECTOMY  2015       Family History   Problem Relation Age of Onset   • Arthritis Mother    • Diabetes Mother    • Hyperlipidemia Mother    • Arthritis Father    • Hypertension Father    • Heart attack Father 60   • Hyperlipidemia Father    • No Known Problems Sister    • No Known Problems Brother    • Hyperlipidemia Maternal Grandmother    • Alzheimer's disease Maternal  "Grandmother    • Heart attack Maternal Grandfather    • Lung disease Paternal Grandmother    • Aneurysm Paternal Grandmother    • Lung disease Paternal Grandfather    • Other Paternal Grandfather         AAA   • BRCA 1/2 Neg Hx    • Breast cancer Neg Hx    • Colon cancer Neg Hx    • Endometrial cancer Neg Hx    • Ovarian cancer Neg Hx        Social History     Socioeconomic History   • Marital status:    Tobacco Use   • Smoking status: Never   • Smokeless tobacco: Never   Substance and Sexual Activity   • Alcohol use: Yes     Comment: 1 DRINKS A FEW TIMES A WK   • Drug use: No   • Sexual activity: Defer       Allergies   Allergen Reactions   • Codeine Itching and GI Intolerance       Review of Systems   Constitutional: Negative for activity change, appetite change, diaphoresis, fatigue, unexpected weight gain and unexpected weight loss.   HENT: Negative for hearing loss.    Eyes: Negative for visual disturbance.   Respiratory: Negative for chest tightness and shortness of breath.    Cardiovascular: Negative for chest pain, palpitations and leg swelling.   Gastrointestinal: Positive for constipation. Negative for abdominal pain, blood in stool, GERD and indigestion.   Endocrine: Negative for cold intolerance and heat intolerance.   Genitourinary: Negative for dysuria and hematuria.   Musculoskeletal: Negative for arthralgias and myalgias.   Skin: Negative for skin lesions.   Neurological: Negative for tremors, seizures, syncope, speech difficulty, weakness, headache, memory problem and confusion.   Hematological: Does not bruise/bleed easily.   Psychiatric/Behavioral: Negative for sleep disturbance and depressed mood. The patient is not nervous/anxious.         Vital Signs  Vitals:    05/18/23 1401   BP: 116/60   BP Location: Left arm   Patient Position: Sitting   Cuff Size: Adult   Pulse: 88   Temp: 98.6 °F (37 °C)   TempSrc: Infrared   SpO2: 96%   Weight: 59.4 kg (131 lb)   Height: 162.6 cm (64.02\")     Body " mass index is 22.47 kg/m².  BMI is within normal parameters. No other follow-up for BMI required.     Advance Care Planning   ACP discussion was held with the patient during this visit. Patient does not have an advance directive, information provided.       Current Outpatient Medications:   •  albuterol sulfate  (90 Base) MCG/ACT inhaler, Inhale 2 puffs Every 4 (Four) Hours As Needed for Wheezing., Disp: 18 g, Rfl: 1  •  B Complex-Biotin-FA (B-COMPLEX PO), Take  by mouth Daily., Disp: , Rfl:   •  benzonatate (Tessalon Perles) 100 MG capsule, Take 1 capsule by mouth At Night As Needed for Cough., Disp: 30 capsule, Rfl: 1  •  Biotin 10 MG capsule, Take 1 tablet by mouth Daily., Disp: , Rfl:   •  cetirizine (zyrTEC) 10 MG tablet, Take 1 tablet by mouth Daily., Disp: , Rfl:   •  Cream Base cream, Testosterone 0.5mg: Apply 2 clicks once daily, Disp: 15 g, Rfl: 0  •  Cream Base cream, Estradiol 0.5mg/ estriol 0.5mg: Apply 3 clicks once a day, Disp: 20 g, Rfl: 2  •  Cream Base cream, Progesterone 150mg capsule: Take 1 capsule at bedtime, Disp: 30 g, Rfl: 5  •  Cyanocobalamin (B-12) 1000 MCG capsule, Take 1 tablet by mouth Daily., Disp: , Rfl:   •  Digestive Enzymes (DIGESTIVE ENZYME PO), Take 1 capsule by mouth Daily., Disp: , Rfl:   •  Omega-3 Fatty Acids (FISH OIL) 1000 MG capsule capsule, Take  by mouth Daily With Breakfast., Disp: , Rfl:   •  Probiotic Product (PROBIOTIC-10) capsule, Take 1 capsule by mouth Daily., Disp: , Rfl:   •  Pseudoephedrine HCl 30 MG tablet , Take 30 mg by mouth 2 (Two) Times a Day As Needed., Disp: , Rfl:   •  azithromycin (Zithromax Z-Allan) 250 MG tablet, Take 2 tablets by mouth on day 1, then 1 tablet daily on days 2-5 (Patient not taking: Reported on 5/18/2023), Disp: 6 tablet, Rfl: 0  •  Glycopyrronium Tosylate 2.4 % pads, Apply 1 pad topically Daily., Disp: 30 each, Rfl: 3    Physical Exam  Vitals reviewed.   Constitutional:       Appearance: Normal appearance. She is well-developed  and normal weight.   HENT:      Head: Normocephalic and atraumatic.      Right Ear: Hearing, tympanic membrane, ear canal and external ear normal.      Left Ear: Hearing, tympanic membrane, ear canal and external ear normal.      Nose: Nose normal.      Mouth/Throat:      Pharynx: Uvula midline.   Eyes:      General: Lids are normal.      Conjunctiva/sclera: Conjunctivae normal.      Pupils: Pupils are equal, round, and reactive to light.   Cardiovascular:      Rate and Rhythm: Normal rate and regular rhythm.      Heart sounds: Normal heart sounds.   Pulmonary:      Effort: Pulmonary effort is normal.      Breath sounds: Normal breath sounds.   Abdominal:      General: Bowel sounds are normal.      Palpations: Abdomen is soft.   Musculoskeletal:         General: Normal range of motion.      Cervical back: Full passive range of motion without pain, normal range of motion and neck supple.   Skin:     General: Skin is warm and dry.   Neurological:      General: No focal deficit present.      Mental Status: She is alert and oriented to person, place, and time. Mental status is at baseline.      Deep Tendon Reflexes: Reflexes are normal and symmetric.   Psychiatric:         Speech: Speech normal.         Behavior: Behavior normal.         Thought Content: Thought content normal.         Judgment: Judgment normal.          ACE III MINI            Results Review:    No results found for this or any previous visit (from the past 672 hour(s)).    ECG 12 Lead    Date/Time: 5/21/2023 5:30 PM  Performed by: Angelica Lynch PA-C  Authorized by: Angelica Lynch PA-C   Comparison: not compared with previous ECG   Rhythm: sinus rhythm and sinus arrhythmia  BPM: 84    Clinical impression: normal ECG  Comments: Sinus rhythm with sinus arrhythmia ventricular rate 84 bpm normal EKG            Medication Review: Medications reviewed and noted    Social History     Socioeconomic History   • Marital status:    Tobacco  Use   • Smoking status: Never   • Smokeless tobacco: Never   Substance and Sexual Activity   • Alcohol use: Yes     Comment: 1 DRINKS A FEW TIMES A WK   • Drug use: No   • Sexual activity: Defer        Assessment/Plan:    Diagnoses and all orders for this visit:    1. Routine medical exam (Primary)  Overview:  EKG reviewed with patient.  Normal sinus rhythm.  Order for fasting labs placed.  Patient encouraged to continue healthy eating and regular cardiovascular exercise.  She is current on vaccinations.  Discussed importance of screening procedures.  Mammogram ordered.  She declines colonoscopy for now.  History of reaction to propofol.    Orders:  -     ECG 12 Lead  -     CBC & Differential; Future  -     Comprehensive Metabolic Panel; Future    2. Abnormal glucose  Overview:  Takes metformin 250mg daily.  Most recent A1c 5.6%.    Orders:  -     Hemoglobin A1c; Future  -     C-Peptide; Future    3. Mixed hyperlipidemia  Overview:  Continue low fat diet.  She declines statin. May start red yeast rice, as this was recommended by another physician.    Orders:  -     Cancel: ECG 12 Lead  -     Lipid Panel; Future    4. Encounter for screening mammogram for malignant neoplasm of breast  -     Mammo Screening Digital Tomosynthesis Bilateral With CAD; Future    5. Slow transit constipation  Overview:  Takes metformin twice a day.    Orders:  -     TSH; Future  -     T4, Free; Future  -     T3, Free; Future    6. Hormone imbalance  Overview:  HRT managed by [facility].    Orders:  -     C-reactive Protein; Future    7. Encounter for vitamin deficiency screening  -     Vitamin B12; Future  -     Vitamin D,25-Hydroxy; Future       Ivette takes many supplements.  I have discussed with her concerns regarding interactions between medications and her supplements.  She will continue heart healthy diet.    Plan of care reviewed with patient at the conclusion of today's visit. Education was provided regarding  diagnosis, management, and any prescribed or recommended OTC medications.Patient verbalizes understanding of and agreement with management plan.       I spent 32 minutes caring for Ivette on this date of service. This time includes time spent by me in the following activities:preparing for the visit, reviewing tests, obtaining and/or reviewing a separately obtained history, performing a medically appropriate examination and/or evaluation , counseling and educating the patient/family/caregiver, ordering medications, tests, or procedures, referring and communicating with other health care professionals  and documenting information in the medical record    Angelica Lynch PA-C      Note: Part of this note may be an electronic transcription/translation of spoken language to printed text using the Dragon Dictation system.

## 2023-05-21 PROBLEM — Z12.31 ENCOUNTER FOR SCREENING MAMMOGRAM FOR MALIGNANT NEOPLASM OF BREAST: Status: ACTIVE | Noted: 2023-05-21

## 2023-05-26 ENCOUNTER — LAB (OUTPATIENT)
Dept: LAB | Facility: HOSPITAL | Age: 53
End: 2023-05-26
Payer: COMMERCIAL

## 2023-05-26 DIAGNOSIS — K59.01 SLOW TRANSIT CONSTIPATION: ICD-10-CM

## 2023-05-26 DIAGNOSIS — E78.2 MIXED HYPERLIPIDEMIA: ICD-10-CM

## 2023-05-26 DIAGNOSIS — Z13.21 ENCOUNTER FOR VITAMIN DEFICIENCY SCREENING: ICD-10-CM

## 2023-05-26 DIAGNOSIS — Z00.00 ROUTINE MEDICAL EXAM: ICD-10-CM

## 2023-05-26 DIAGNOSIS — E34.9 HORMONE IMBALANCE: ICD-10-CM

## 2023-05-26 DIAGNOSIS — R73.09 ABNORMAL GLUCOSE: ICD-10-CM

## 2023-05-26 LAB
25(OH)D3 SERPL-MCNC: 92.4 NG/ML (ref 30–100)
ALBUMIN SERPL-MCNC: 4.1 G/DL (ref 3.5–5.2)
ALBUMIN/GLOB SERPL: 2 G/DL
ALP SERPL-CCNC: 42 U/L (ref 39–117)
ALT SERPL W P-5'-P-CCNC: 35 U/L (ref 1–33)
ANION GAP SERPL CALCULATED.3IONS-SCNC: 11.7 MMOL/L (ref 5–15)
AST SERPL-CCNC: 33 U/L (ref 1–32)
BASOPHILS # BLD AUTO: 0.03 10*3/MM3 (ref 0–0.2)
BASOPHILS NFR BLD AUTO: 0.5 % (ref 0–1.5)
BILIRUB SERPL-MCNC: 0.4 MG/DL (ref 0–1.2)
BUN SERPL-MCNC: 13 MG/DL (ref 6–20)
BUN/CREAT SERPL: 17.3 (ref 7–25)
CALCIUM SPEC-SCNC: 9 MG/DL (ref 8.6–10.5)
CHLORIDE SERPL-SCNC: 102 MMOL/L (ref 98–107)
CHOLEST SERPL-MCNC: 191 MG/DL (ref 0–200)
CO2 SERPL-SCNC: 23.3 MMOL/L (ref 22–29)
CREAT SERPL-MCNC: 0.75 MG/DL (ref 0.57–1)
CRP SERPL-MCNC: <0.3 MG/DL (ref 0–0.5)
DEPRECATED RDW RBC AUTO: 41.5 FL (ref 37–54)
EGFRCR SERPLBLD CKD-EPI 2021: 95.9 ML/MIN/1.73
EOSINOPHIL # BLD AUTO: 0.08 10*3/MM3 (ref 0–0.4)
EOSINOPHIL NFR BLD AUTO: 1.4 % (ref 0.3–6.2)
ERYTHROCYTE [DISTWIDTH] IN BLOOD BY AUTOMATED COUNT: 12 % (ref 12.3–15.4)
GLOBULIN UR ELPH-MCNC: 2.1 GM/DL
GLUCOSE SERPL-MCNC: 91 MG/DL (ref 65–99)
HBA1C MFR BLD: 5.5 % (ref 4.8–5.6)
HCT VFR BLD AUTO: 38.6 % (ref 34–46.6)
HDLC SERPL-MCNC: 62 MG/DL (ref 40–60)
HGB BLD-MCNC: 12.8 G/DL (ref 12–15.9)
IMM GRANULOCYTES # BLD AUTO: 0.01 10*3/MM3 (ref 0–0.05)
IMM GRANULOCYTES NFR BLD AUTO: 0.2 % (ref 0–0.5)
LDLC SERPL CALC-MCNC: 119 MG/DL (ref 0–100)
LDLC/HDLC SERPL: 1.91 {RATIO}
LYMPHOCYTES # BLD AUTO: 1.64 10*3/MM3 (ref 0.7–3.1)
LYMPHOCYTES NFR BLD AUTO: 28.7 % (ref 19.6–45.3)
MCH RBC QN AUTO: 31 PG (ref 26.6–33)
MCHC RBC AUTO-ENTMCNC: 33.2 G/DL (ref 31.5–35.7)
MCV RBC AUTO: 93.5 FL (ref 79–97)
MONOCYTES # BLD AUTO: 0.52 10*3/MM3 (ref 0.1–0.9)
MONOCYTES NFR BLD AUTO: 9.1 % (ref 5–12)
NEUTROPHILS NFR BLD AUTO: 3.44 10*3/MM3 (ref 1.7–7)
NEUTROPHILS NFR BLD AUTO: 60.1 % (ref 42.7–76)
NRBC BLD AUTO-RTO: 0 /100 WBC (ref 0–0.2)
PLATELET # BLD AUTO: 363 10*3/MM3 (ref 140–450)
PMV BLD AUTO: 9.4 FL (ref 6–12)
POTASSIUM SERPL-SCNC: 4.2 MMOL/L (ref 3.5–5.2)
PROT SERPL-MCNC: 6.2 G/DL (ref 6–8.5)
RBC # BLD AUTO: 4.13 10*6/MM3 (ref 3.77–5.28)
SODIUM SERPL-SCNC: 137 MMOL/L (ref 136–145)
T3FREE SERPL-MCNC: 2.19 PG/ML (ref 2–4.4)
T4 FREE SERPL-MCNC: 0.92 NG/DL (ref 0.93–1.7)
TRIGL SERPL-MCNC: 52 MG/DL (ref 0–150)
TSH SERPL DL<=0.05 MIU/L-ACNC: 1.9 UIU/ML (ref 0.27–4.2)
VIT B12 BLD-MCNC: 679 PG/ML (ref 211–946)
VLDLC SERPL-MCNC: 10 MG/DL (ref 5–40)
WBC NRBC COR # BLD: 5.72 10*3/MM3 (ref 3.4–10.8)

## 2023-05-26 PROCEDURE — 82607 VITAMIN B-12: CPT

## 2023-05-26 PROCEDURE — 84481 FREE ASSAY (FT-3): CPT

## 2023-05-26 PROCEDURE — 83036 HEMOGLOBIN GLYCOSYLATED A1C: CPT

## 2023-05-26 PROCEDURE — 80061 LIPID PANEL: CPT

## 2023-05-26 PROCEDURE — 84439 ASSAY OF FREE THYROXINE: CPT

## 2023-05-26 PROCEDURE — 82306 VITAMIN D 25 HYDROXY: CPT

## 2023-05-26 PROCEDURE — 84681 ASSAY OF C-PEPTIDE: CPT

## 2023-05-26 PROCEDURE — 86140 C-REACTIVE PROTEIN: CPT

## 2023-05-26 PROCEDURE — 80050 GENERAL HEALTH PANEL: CPT

## 2023-05-27 LAB — C PEPTIDE SERPL-MCNC: 1.2 NG/ML (ref 1.1–4.4)

## 2023-07-24 ENCOUNTER — HOSPITAL ENCOUNTER (OUTPATIENT)
Dept: MAMMOGRAPHY | Facility: HOSPITAL | Age: 53
Discharge: HOME | End: 2023-07-24
Admitting: PHYSICIAN ASSISTANT
Payer: COMMERCIAL

## 2023-07-24 DIAGNOSIS — Z12.31 ENCOUNTER FOR SCREENING MAMMOGRAM FOR MALIGNANT NEOPLASM OF BREAST: ICD-10-CM

## 2023-07-24 PROCEDURE — 77067 SCR MAMMO BI INCL CAD: CPT

## 2023-07-24 PROCEDURE — 77063 BREAST TOMOSYNTHESIS BI: CPT

## 2023-10-05 DIAGNOSIS — H04.129 DRY EYE: Primary | ICD-10-CM

## 2023-10-09 ENCOUNTER — LAB (OUTPATIENT)
Dept: LAB | Facility: HOSPITAL | Age: 53
End: 2023-10-09
Payer: COMMERCIAL

## 2023-10-09 DIAGNOSIS — H04.129 DRY EYE: ICD-10-CM

## 2023-10-09 LAB
CRP SERPL-MCNC: <0.3 MG/DL (ref 0–0.5)
ERYTHROCYTE [SEDIMENTATION RATE] IN BLOOD: 5 MM/HR (ref 0–30)

## 2023-10-09 PROCEDURE — 86038 ANTINUCLEAR ANTIBODIES: CPT

## 2023-10-09 PROCEDURE — 86140 C-REACTIVE PROTEIN: CPT

## 2023-10-09 PROCEDURE — 85652 RBC SED RATE AUTOMATED: CPT

## 2023-10-11 LAB — ANA SER QL: NEGATIVE

## 2024-02-08 DIAGNOSIS — J01.00 ACUTE NON-RECURRENT MAXILLARY SINUSITIS: ICD-10-CM

## 2024-02-08 RX ORDER — AZITHROMYCIN 250 MG/1
TABLET, FILM COATED ORAL
Qty: 6 TABLET | Refills: 0 | Status: SHIPPED | OUTPATIENT
Start: 2024-02-08

## 2024-06-04 DIAGNOSIS — Z13.21 ENCOUNTER FOR VITAMIN DEFICIENCY SCREENING: ICD-10-CM

## 2024-06-04 DIAGNOSIS — R73.09 ABNORMAL GLUCOSE: ICD-10-CM

## 2024-06-04 DIAGNOSIS — E78.2 MIXED HYPERLIPIDEMIA: ICD-10-CM

## 2024-06-04 DIAGNOSIS — E34.9 HORMONE IMBALANCE: Primary | ICD-10-CM

## 2024-06-06 ENCOUNTER — OFFICE VISIT (OUTPATIENT)
Dept: INTERNAL MEDICINE | Facility: CLINIC | Age: 54
End: 2024-06-06
Payer: COMMERCIAL

## 2024-06-06 ENCOUNTER — LAB (OUTPATIENT)
Dept: LAB | Facility: HOSPITAL | Age: 54
End: 2024-06-06
Payer: COMMERCIAL

## 2024-06-06 VITALS
HEIGHT: 64 IN | TEMPERATURE: 98.6 F | BODY MASS INDEX: 21.61 KG/M2 | HEART RATE: 68 BPM | SYSTOLIC BLOOD PRESSURE: 110 MMHG | WEIGHT: 126.6 LBS | DIASTOLIC BLOOD PRESSURE: 64 MMHG | OXYGEN SATURATION: 100 %

## 2024-06-06 DIAGNOSIS — Z79.890 HORMONE REPLACEMENT THERAPY: ICD-10-CM

## 2024-06-06 DIAGNOSIS — E34.9 HORMONE IMBALANCE: ICD-10-CM

## 2024-06-06 DIAGNOSIS — Z11.59 ENCOUNTER FOR HEPATITIS C SCREENING TEST FOR LOW RISK PATIENT: ICD-10-CM

## 2024-06-06 DIAGNOSIS — E78.2 MIXED HYPERLIPIDEMIA: ICD-10-CM

## 2024-06-06 DIAGNOSIS — Z13.21 ENCOUNTER FOR VITAMIN DEFICIENCY SCREENING: ICD-10-CM

## 2024-06-06 DIAGNOSIS — Z00.00 ROUTINE MEDICAL EXAM: Primary | ICD-10-CM

## 2024-06-06 DIAGNOSIS — R73.09 ABNORMAL GLUCOSE: ICD-10-CM

## 2024-06-06 DIAGNOSIS — M77.11 LATERAL EPICONDYLITIS OF RIGHT ELBOW: ICD-10-CM

## 2024-06-06 DIAGNOSIS — K59.01 SLOW TRANSIT CONSTIPATION: ICD-10-CM

## 2024-06-06 LAB
25(OH)D3 SERPL-MCNC: 94.1 NG/ML (ref 30–100)
ALBUMIN SERPL-MCNC: 4.8 G/DL (ref 3.5–5.2)
ALBUMIN UR-MCNC: <1.2 MG/DL
ALBUMIN/GLOB SERPL: 1.8 G/DL
ALP SERPL-CCNC: 40 U/L (ref 39–117)
ALT SERPL W P-5'-P-CCNC: 24 U/L (ref 1–33)
ANION GAP SERPL CALCULATED.3IONS-SCNC: 9 MMOL/L (ref 5–15)
AST SERPL-CCNC: 21 U/L (ref 1–32)
BASOPHILS # BLD AUTO: 0.05 10*3/MM3 (ref 0–0.2)
BASOPHILS NFR BLD AUTO: 1.1 % (ref 0–1.5)
BILIRUB SERPL-MCNC: 0.3 MG/DL (ref 0–1.2)
BUN SERPL-MCNC: 9 MG/DL (ref 6–20)
BUN/CREAT SERPL: 12.9 (ref 7–25)
CALCIUM SPEC-SCNC: 9.3 MG/DL (ref 8.6–10.5)
CHLORIDE SERPL-SCNC: 102 MMOL/L (ref 98–107)
CHOLEST SERPL-MCNC: 235 MG/DL (ref 0–200)
CO2 SERPL-SCNC: 27 MMOL/L (ref 22–29)
CREAT SERPL-MCNC: 0.7 MG/DL (ref 0.57–1)
DEPRECATED RDW RBC AUTO: 41.9 FL (ref 37–54)
EGFRCR SERPLBLD CKD-EPI 2021: 103.6 ML/MIN/1.73
EOSINOPHIL # BLD AUTO: 0.15 10*3/MM3 (ref 0–0.4)
EOSINOPHIL NFR BLD AUTO: 3.3 % (ref 0.3–6.2)
ERYTHROCYTE [DISTWIDTH] IN BLOOD BY AUTOMATED COUNT: 12.1 % (ref 12.3–15.4)
GLOBULIN UR ELPH-MCNC: 2.7 GM/DL
GLUCOSE SERPL-MCNC: 89 MG/DL (ref 65–99)
HBA1C MFR BLD: 6.1 % (ref 4.8–5.6)
HCT VFR BLD AUTO: 44.5 % (ref 34–46.6)
HCV AB SER QL: NORMAL
HDLC SERPL-MCNC: 79 MG/DL (ref 40–60)
HGB BLD-MCNC: 14.6 G/DL (ref 12–15.9)
IMM GRANULOCYTES # BLD AUTO: 0.01 10*3/MM3 (ref 0–0.05)
IMM GRANULOCYTES NFR BLD AUTO: 0.2 % (ref 0–0.5)
LDLC SERPL CALC-MCNC: 139 MG/DL (ref 0–100)
LDLC/HDLC SERPL: 1.73 {RATIO}
LYMPHOCYTES # BLD AUTO: 1.76 10*3/MM3 (ref 0.7–3.1)
LYMPHOCYTES NFR BLD AUTO: 38.3 % (ref 19.6–45.3)
MCH RBC QN AUTO: 30.7 PG (ref 26.6–33)
MCHC RBC AUTO-ENTMCNC: 32.8 G/DL (ref 31.5–35.7)
MCV RBC AUTO: 93.7 FL (ref 79–97)
MONOCYTES # BLD AUTO: 0.4 10*3/MM3 (ref 0.1–0.9)
MONOCYTES NFR BLD AUTO: 8.7 % (ref 5–12)
NEUTROPHILS NFR BLD AUTO: 2.22 10*3/MM3 (ref 1.7–7)
NEUTROPHILS NFR BLD AUTO: 48.4 % (ref 42.7–76)
NRBC BLD AUTO-RTO: 0 /100 WBC (ref 0–0.2)
PLATELET # BLD AUTO: 304 10*3/MM3 (ref 140–450)
PMV BLD AUTO: 11.1 FL (ref 6–12)
POTASSIUM SERPL-SCNC: 4.4 MMOL/L (ref 3.5–5.2)
PROT SERPL-MCNC: 7.5 G/DL (ref 6–8.5)
RBC # BLD AUTO: 4.75 10*6/MM3 (ref 3.77–5.28)
SODIUM SERPL-SCNC: 138 MMOL/L (ref 136–145)
T3FREE SERPL-MCNC: 2.74 PG/ML (ref 2–4.4)
T4 FREE SERPL-MCNC: 1.2 NG/DL (ref 0.92–1.68)
TRIGL SERPL-MCNC: 96 MG/DL (ref 0–150)
TSH SERPL DL<=0.05 MIU/L-ACNC: 2.28 UIU/ML (ref 0.27–4.2)
VIT B12 BLD-MCNC: 916 PG/ML (ref 211–946)
VLDLC SERPL-MCNC: 17 MG/DL (ref 5–40)
WBC NRBC COR # BLD AUTO: 4.59 10*3/MM3 (ref 3.4–10.8)

## 2024-06-06 PROCEDURE — 84681 ASSAY OF C-PEPTIDE: CPT

## 2024-06-06 PROCEDURE — 86803 HEPATITIS C AB TEST: CPT

## 2024-06-06 PROCEDURE — 80050 GENERAL HEALTH PANEL: CPT

## 2024-06-06 PROCEDURE — 86376 MICROSOMAL ANTIBODY EACH: CPT

## 2024-06-06 PROCEDURE — 82607 VITAMIN B-12: CPT

## 2024-06-06 PROCEDURE — 86800 THYROGLOBULIN ANTIBODY: CPT

## 2024-06-06 PROCEDURE — 83036 HEMOGLOBIN GLYCOSYLATED A1C: CPT

## 2024-06-06 PROCEDURE — 84439 ASSAY OF FREE THYROXINE: CPT

## 2024-06-06 PROCEDURE — 99396 PREV VISIT EST AGE 40-64: CPT | Performed by: PHYSICIAN ASSISTANT

## 2024-06-06 PROCEDURE — 80061 LIPID PANEL: CPT

## 2024-06-06 PROCEDURE — 84481 FREE ASSAY (FT-3): CPT

## 2024-06-06 PROCEDURE — 82306 VITAMIN D 25 HYDROXY: CPT

## 2024-06-06 PROCEDURE — 82043 UR ALBUMIN QUANTITATIVE: CPT

## 2024-06-06 PROCEDURE — 93000 ELECTROCARDIOGRAM COMPLETE: CPT | Performed by: PHYSICIAN ASSISTANT

## 2024-06-06 NOTE — PROGRESS NOTES
[facility] Internal Medicine    Ivette Warren  1970   [id number]      Patient Care Team:  Angelica Lynch PA-C as PCP - General (Internal Medicine)    Chief Complaint::   Chief Complaint   Patient presents with   • Annual Exam     Wants a referral for gyn        HPI  Ivette is a 53 year old female who presents for routine physical.  Past medical history significant for hormone imbalance, elevated glucose, and mild hyperlipidemia.  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  She exercises regularly,has lost 5 pounds in the last year.  [facility] for [facility]RT.  She is due for colonoscopy, but has had previous reaction to propofol. She does have history of constipation, would like to be referred to Dr. Alva Valerio to discuss first before having colonoscopy.  Recurrent tendinitis of right elbow.    Patient Active Problem List   Diagnosis   • Fatigue   • Hormone imbalance   • Slow transit constipation   • Mixed hyperlipidemia   • Abnormal glucose   • Encounter for vitamin deficiency screening   • Visual changes   • Routine medical exam   • Acute non-recurrent maxillary sinusitis   • Acute cough   • Acute bronchitis due to other specified organisms   • Encounter for screening mammogram for malignant neoplasm of breast   • Encounter for hepatitis C screening test for low risk patient   • Lateral epicondylitis of right elbow        No past medical history on file.    Past Surgical History:   Procedure Laterality Date   • CHOLECYSTECTOMY  2015       Family History   Problem Relation Age of Onset   • Arthritis Mother    • Diabetes Mother    • Hyperlipidemia Mother    • Arthritis Father    • Hypertension Father    • Heart attack Father 60   • Hyperlipidemia Father    • No Known Problems Sister    • No Known Problems Brother    • Hyperlipidemia Maternal Grandmother    • Alzheimer's disease Maternal Grandmother    • Heart attack Maternal  Grandfather    • Lung disease Paternal Grandmother    • Aneurysm Paternal Grandmother    • Lung disease Paternal Grandfather    • Other Paternal Grandfather         AAA   • BRCA 1/2 Neg Hx    • Breast cancer Neg Hx    • Colon cancer Neg Hx    • Endometrial cancer Neg Hx    • Ovarian cancer Neg Hx        Social History     Socioeconomic History   • Marital status:    Tobacco Use   • Smoking status: Never   • Smokeless tobacco: Never   Vaping Use   • Vaping status: Never Used   Substance and Sexual Activity   • Alcohol use: Yes     Comment: 1 DRINKS A FEW TIMES A WK   • Drug use: No   • Sexual activity: Defer       Allergies   Allergen Reactions   • Codeine Itching and GI Intolerance       Review of Systems   Constitutional:  Positive for unexpected weight loss. Negative for activity change, appetite change, diaphoresis, fatigue and unexpected weight gain.   HENT:  Negative for hearing loss.    Eyes:  Negative for blurred vision, double vision and visual disturbance.   Respiratory:  Negative for chest tightness and shortness of breath.    Cardiovascular:  Negative for chest pain, palpitations and leg swelling.   Gastrointestinal:  Negative for abdominal pain, blood in stool, GERD and indigestion.   Endocrine: Negative for cold intolerance and heat intolerance.   Genitourinary:  Negative for dysuria and hematuria.   Musculoskeletal:  Negative for arthralgias and myalgias.   Skin:  Negative for skin lesions.   Neurological:  Negative for tremors, seizures, syncope, speech difficulty, weakness, headache, memory problem and confusion.   Hematological:  Does not bruise/bleed easily.   Psychiatric/Behavioral:  Negative for sleep disturbance and depressed mood. The patient is not nervous/anxious.         Vital Signs  Vitals:    06/06/24 0912   BP: 110/64   BP Location: Left arm   Patient Position: Sitting   Cuff Size: Adult   Pulse: 68   Temp: 98.6 °F (37 °C)   TempSrc: Infrared   SpO2: 100%   Weight: 57.4 kg (126 lb  "9.6 oz)   Height: 162.6 cm (64.02\")   PainSc: 0-No pain     Body mass index is 21.72 kg/m².  BMI is within normal parameters. No other follow-up for BMI required.     Advance Care Planning   ACP discussion was held with the patient during this visit. Patient does not have an advance directive, information provided.       Current Outpatient Medications:   •  albuterol sulfate  (90 Base) MCG/ACT inhaler, Inhale 2 puffs Every 4 (Four) Hours As Needed for Wheezing., Disp: 18 g, Rfl: 1  •  B Complex-Biotin-FA (B-COMPLEX PO), Take  by mouth Daily., Disp: , Rfl:   •  Biotin 10 MG capsule, Take 1 tablet by mouth Daily., Disp: , Rfl:   •  cetirizine (zyrTEC) 10 MG tablet, Take 1 tablet by mouth Daily., Disp: , Rfl:   •  Cream Base cream, Testosterone 0.5mg: Apply 2 clicks once daily, Disp: 15 g, Rfl: 0  •  Cream Base cream, Estradiol 0.5mg/ estriol 0.5mg: Apply 3 clicks once a day, Disp: 20 g, Rfl: 2  •  Cream Base cream, Progesterone 150mg capsule: Take 1 capsule at bedtime, Disp: 30 g, Rfl: 5  •  Cyanocobalamin (B-12) 1000 MCG capsule, Take 1 tablet by mouth Daily., Disp: , Rfl:   •  Digestive Enzymes (DIGESTIVE ENZYME PO), Take 1 capsule by mouth Daily., Disp: , Rfl:   •  Omega-3 Fatty Acids (FISH OIL) 1000 MG capsule capsule, Take  by mouth Daily With Breakfast., Disp: , Rfl:   •  Probiotic Product (PROBIOTIC-10) capsule, Take 1 capsule by mouth Daily., Disp: , Rfl:   •  Pseudoephedrine HCl 30 MG tablet , Take 30 mg by mouth 2 (Two) Times a Day As Needed., Disp: , Rfl:   •  Diclofenac Sodium (VOLTAREN) 1 % gel gel, Apply 4 g topically to the appropriate area as directed 3 (Three) Times a Day., Disp: 50 g, Rfl: 2    Physical Exam  Vitals reviewed.   Constitutional:       Appearance: Normal appearance. She is well-developed.   HENT:      Head: Normocephalic and atraumatic.      Right Ear: Hearing, tympanic membrane, ear canal and external ear normal.      Left Ear: Hearing, tympanic membrane, ear canal and external " ear normal.      Nose: Nose normal.      Mouth/Throat:      Pharynx: Uvula midline.   Eyes:      General: Lids are normal.      Conjunctiva/sclera: Conjunctivae normal.      Pupils: Pupils are equal, round, and reactive to light.   Cardiovascular:      Rate and Rhythm: Normal rate and regular rhythm.      Heart sounds: Normal heart sounds.   Pulmonary:      Effort: Pulmonary effort is normal.      Breath sounds: Normal breath sounds.   Abdominal:      General: Bowel sounds are normal.      Palpations: Abdomen is soft.   Musculoskeletal:         General: Normal range of motion.      Cervical back: Full passive range of motion without pain, normal range of motion and neck supple.   Skin:     General: Skin is warm and dry.   Neurological:      Mental Status: She is alert and oriented to person, place, and time.      Deep Tendon Reflexes: Reflexes are normal and symmetric.   Psychiatric:         Speech: Speech normal.         Behavior: Behavior normal.         Thought Content: Thought content normal.         Judgment: Judgment normal.        ACE III MINI            Results Review:    No results found for this or any previous visit (from the past 672 hour(s)).    ECG 12 Lead    Date/Time: 6/6/2024 9:48 AM  Performed by: Angelica Lynch PA-C    Authorized by: Angelica Lynch PA-C  Comparison: compared with previous ECG from 5/21/2023  Similar to previous ECG  BPM: 65    Clinical impression: normal ECG  Comments: Sinus rhythm with ventricular rate of 65 bpm        Medication Review: Medications reviewed and noted    Social History     Socioeconomic History   • Marital status:    Tobacco Use   • Smoking status: Never   • Smokeless tobacco: Never   Vaping Use   • Vaping status: Never Used   Substance and Sexual Activity   • Alcohol use: Yes     Comment: 1 DRINKS A FEW TIMES A WK   • Drug use: No   • Sexual activity: Defer        Assessment/Plan:    Diagnoses and all orders for this visit:    1. Routine  medical exam (Primary)  Overview:  EKG reviewed with patient.  Normal sinus rhythm.  Order for fasting labs placed.  Patient encouraged to continue healthy eating and regular cardiovascular exercise.  She is current on vaccinations.  Discussed importance of screening procedures.  Colonoscopy pending, until she discusses with surgeon.    Orders:  -     ECG 12 Lead    2. Hormone replacement therapy  Overview:  HRT managed by [facility].  Progesterone every night.  Estradiol and testosterone cream.    Orders:  -     ECG 12 Lead    3. Slow transit constipation  Overview:  Takes digestive enzymes    Orders:  -     Ambulatory Referral to Colorectal Surgery    4. Encounter for hepatitis C screening test for low risk patient  -     Hepatitis C Antibody; Future    5. Lateral epicondylitis of right elbow  -     Diclofenac Sodium (VOLTAREN) 1 % gel gel; Apply 4 g topically to the appropriate area as directed 3 (Three) Times a Day.  Dispense: 50 g; Refill: 2    6. Mixed hyperlipidemia  Overview:  Continue low fat diet.  She declines statin.       7. Abnormal glucose  Overview:  Recheck A1C.         Plan of care reviewed with patient at the conclusion of today's visit. Education was provided regarding diagnosis, management, and any prescribed or recommended OTC medications.Patient verbalizes understanding of and agreement with management plan.       I have seen Ivette on this date of service:preparing for the visit, reviewing tests, obtaining and/or reviewing a separately obtained history, performing a medically appropriate examination and/or evaluation , counseling and educating the patient/family/caregiver, ordering medications, tests, or procedures, referring and communicating with other health care professionals , and documenting information in the medical record    Angelica Lynch PA-C      Note: Part of this note may be an electronic transcription/translation of spoken language to printed text using  the Dragon Dictation system.

## 2024-06-07 LAB
C PEPTIDE SERPL-MCNC: 1.4 NG/ML (ref 1.1–4.4)
THYROGLOB AB SERPL-ACNC: <1 IU/ML (ref 0–0.9)
THYROPEROXIDASE AB SERPL-ACNC: <9 IU/ML (ref 0–34)

## 2024-07-24 ENCOUNTER — TRANSCRIBE ORDERS (OUTPATIENT)
Dept: ADMINISTRATIVE | Facility: HOSPITAL | Age: 54
End: 2024-07-24
Payer: COMMERCIAL

## 2024-07-24 DIAGNOSIS — Z12.31 SCREENING MAMMOGRAM FOR BREAST CANCER: Primary | ICD-10-CM

## 2024-07-26 ENCOUNTER — HOSPITAL ENCOUNTER (OUTPATIENT)
Dept: MAMMOGRAPHY | Facility: HOSPITAL | Age: 54
Discharge: HOME | End: 2024-07-26
Admitting: PHYSICIAN ASSISTANT
Payer: COMMERCIAL

## 2024-07-26 DIAGNOSIS — Z12.31 SCREENING MAMMOGRAM FOR BREAST CANCER: ICD-10-CM

## 2024-07-26 PROCEDURE — 77063 BREAST TOMOSYNTHESIS BI: CPT

## 2024-07-26 PROCEDURE — 77067 SCR MAMMO BI INCL CAD: CPT

## 2025-08-05 ENCOUNTER — TRANSCRIBE ORDERS (OUTPATIENT)
Dept: ADMINISTRATIVE | Facility: HOSPITAL | Age: 55
End: 2025-08-05
Payer: COMMERCIAL

## 2025-08-05 ENCOUNTER — TELEPHONE (OUTPATIENT)
Dept: INTERNAL MEDICINE | Facility: CLINIC | Age: 55
End: 2025-08-05
Payer: COMMERCIAL

## 2025-08-05 DIAGNOSIS — Z12.31 VISIT FOR SCREENING MAMMOGRAM: Primary | ICD-10-CM

## 2025-08-05 DIAGNOSIS — R74.8 ELEVATED LIVER ENZYMES: Primary | ICD-10-CM

## 2025-08-06 ENCOUNTER — HOSPITAL ENCOUNTER (OUTPATIENT)
Dept: MAMMOGRAPHY | Facility: HOSPITAL | Age: 55
Discharge: HOME | End: 2025-08-06
Admitting: PHYSICIAN ASSISTANT
Payer: COMMERCIAL

## 2025-08-06 DIAGNOSIS — Z12.31 VISIT FOR SCREENING MAMMOGRAM: ICD-10-CM

## 2025-08-06 PROCEDURE — 77067 SCR MAMMO BI INCL CAD: CPT

## 2025-08-06 PROCEDURE — 77063 BREAST TOMOSYNTHESIS BI: CPT

## 2025-08-07 ENCOUNTER — LAB (OUTPATIENT)
Dept: LAB | Facility: HOSPITAL | Age: 55
End: 2025-08-07
Payer: COMMERCIAL

## 2025-08-07 DIAGNOSIS — R74.8 ELEVATED LIVER ENZYMES: ICD-10-CM

## 2025-08-07 LAB
ALBUMIN SERPL-MCNC: 4.4 G/DL (ref 3.5–5.2)
ALBUMIN/GLOB SERPL: 1.8 G/DL
ALP SERPL-CCNC: 58 U/L (ref 39–117)
ALT SERPL W P-5'-P-CCNC: 42 U/L (ref 1–33)
ANION GAP SERPL CALCULATED.3IONS-SCNC: 10 MMOL/L (ref 5–15)
AST SERPL-CCNC: 32 U/L (ref 1–32)
BILIRUB SERPL-MCNC: <0.2 MG/DL (ref 0–1.2)
BUN SERPL-MCNC: 18 MG/DL (ref 6–20)
BUN/CREAT SERPL: 21.2 (ref 7–25)
CALCIUM SPEC-SCNC: 9.1 MG/DL (ref 8.6–10.5)
CHLORIDE SERPL-SCNC: 104 MMOL/L (ref 98–107)
CO2 SERPL-SCNC: 25 MMOL/L (ref 22–29)
CREAT SERPL-MCNC: 0.85 MG/DL (ref 0.57–1)
EGFRCR SERPLBLD CKD-EPI 2021: 81.5 ML/MIN/1.73
GGT SERPL-CCNC: 30 U/L (ref 5–36)
GLOBULIN UR ELPH-MCNC: 2.4 GM/DL
GLUCOSE SERPL-MCNC: 106 MG/DL (ref 65–99)
HAV IGM SERPL QL IA: NORMAL
HBV CORE IGM SERPL QL IA: NORMAL
HBV SURFACE AG SERPL QL IA: NORMAL
HCV AB SER QL: NORMAL
POTASSIUM SERPL-SCNC: 4.1 MMOL/L (ref 3.5–5.2)
PROT SERPL-MCNC: 6.8 G/DL (ref 6–8.5)
SODIUM SERPL-SCNC: 139 MMOL/L (ref 136–145)

## 2025-08-07 PROCEDURE — 80074 ACUTE HEPATITIS PANEL: CPT

## 2025-08-07 PROCEDURE — 36415 COLL VENOUS BLD VENIPUNCTURE: CPT

## 2025-08-07 PROCEDURE — 82977 ASSAY OF GGT: CPT

## 2025-08-07 PROCEDURE — 80053 COMPREHEN METABOLIC PANEL: CPT

## 2025-08-08 ENCOUNTER — RESULTS FOLLOW-UP (OUTPATIENT)
Dept: INTERNAL MEDICINE | Facility: CLINIC | Age: 55
End: 2025-08-08
Payer: COMMERCIAL